# Patient Record
Sex: MALE | Race: WHITE | Employment: OTHER | URBAN - METROPOLITAN AREA
[De-identification: names, ages, dates, MRNs, and addresses within clinical notes are randomized per-mention and may not be internally consistent; named-entity substitution may affect disease eponyms.]

---

## 2018-12-23 ENCOUNTER — OFFICE VISIT (OUTPATIENT)
Dept: URGENT CARE | Facility: URGENT CARE | Age: 78
End: 2018-12-23
Payer: COMMERCIAL

## 2018-12-23 VITALS
SYSTOLIC BLOOD PRESSURE: 175 MMHG | DIASTOLIC BLOOD PRESSURE: 85 MMHG | WEIGHT: 191 LBS | HEART RATE: 86 BPM | TEMPERATURE: 97.9 F | OXYGEN SATURATION: 96 %

## 2018-12-23 DIAGNOSIS — B96.89 BACTERIAL CONJUNCTIVITIS OF BOTH EYES: Primary | ICD-10-CM

## 2018-12-23 DIAGNOSIS — H10.9 BACTERIAL CONJUNCTIVITIS OF BOTH EYES: Primary | ICD-10-CM

## 2018-12-23 PROCEDURE — 99203 OFFICE O/P NEW LOW 30 MIN: CPT | Performed by: NURSE PRACTITIONER

## 2018-12-23 RX ORDER — POLYMYXIN B SULFATE AND TRIMETHOPRIM 1; 10000 MG/ML; [USP'U]/ML
1-2 SOLUTION OPHTHALMIC EVERY 4 HOURS
Qty: 5 ML | Refills: 0 | Status: SHIPPED | OUTPATIENT
Start: 2018-12-23 | End: 2019-04-19

## 2018-12-23 NOTE — PROGRESS NOTES
SUBJECTIVE:  Chief Complaint:   Chief Complaint   Patient presents with     Eye Problem     Right, red, itchy, swollen x 3 days. Starting to go in to red eye.     History of Present Illness:  Alonzo Jack is a 78 year old male who presents complaining of mild right eye discharge, mattering, redness, eyelid swelling for 1 day(s).   Onset/timing: sudden.    Now spreading to left eye  Associated Signs and Symptoms: nasal drainage  Treatment measures tried include: none  Contact wearer : No   Denies foreign body, eye pain, visual changes, fever    No past medical history on file.  Current Outpatient Medications   Medication Sig Dispense Refill     acetaminophen (TYLENOL) 325 MG tablet Take 1-2 tablets by mouth every 6 hours as needed.       ATENOLOL OR one tablet daily       azithromycin (ZITHROMAX) 250 MG tablet Two tablets first day, then one tablet daily for four days 6 tablet 0     FENOFIBRATE 145 MG OR TABS 1 TABLET DAILY       GLUCOVANCE 2.5-500 MG OR TABS 1 TABLET TWICE DAILY WITH FOOD       guaiFENesin/codeine (ROBITUSSIN AC) 100-10 MG/5ML SOLN Take 5 mLs by mouth At Bedtime. 120 mL 0     SIMVASTATIN 20 MG OR TABS 1 TABLET EVERY EVENING       ZESTORETIC 20-25 MG OR TABS 1 TABLET DAILY        Family history reviewed  Mom,   Father,     ROS:  CONSTITUTIONAL:NEGATIVE for fever, chills, change in weight  INTEGUMENTARY/SKIN: NEGATIVE for worrisome rashes, moles or lesions  EYES: POSITIVE for mattering right and redness bilaterally, right eyelid mild swelling  ENT/MOUTH: NEGATIVE for ear, mouth and throat problems  RESP:NEGATIVE for significant cough or SOB    OBJECTIVE:  /85   Pulse 86   Temp 97.9  F (36.6  C) (Oral)   Wt 86.6 kg (191 lb)   SpO2 96%   General: no acute distress  Eye exam: right eye abnormal findings: conjunctivitis with erythema, discharge and matting noted, mild eyelid swelling. No periorbital cellulitis.   left eye abnormal findings: conjunctivitis with erythema,  discharge and matting noted.  Ears: normal canals, TMs bilaterally, normal TM mobility  Nose: NORMAL - no drainage, turbinates normal in size.  Neck: supple, non-tender, free range of motion, no adenopathy  Heart: NORMAL - regular rate and rhythm without murmur.  Lungs: normal and clear to auscultation    ASSESSMENT:  Bacterial Conjunctivitis    PLAN:  Warm packs for comfort. Hygiene measures discussed. Polytrim ophthalmic drops-1-2 drops in the affected eye(s) every 4 hours while awake for 3 days.  Home treat and monitor symptoms  Call or rtc if worsening or not improving    THEA Soliman CNP

## 2019-04-19 ENCOUNTER — OFFICE VISIT (OUTPATIENT)
Dept: URGENT CARE | Facility: URGENT CARE | Age: 79
End: 2019-04-19
Payer: COMMERCIAL

## 2019-04-19 VITALS
OXYGEN SATURATION: 94 % | TEMPERATURE: 98.1 F | HEART RATE: 82 BPM | SYSTOLIC BLOOD PRESSURE: 130 MMHG | DIASTOLIC BLOOD PRESSURE: 52 MMHG

## 2019-04-19 DIAGNOSIS — R04.0 EPISTAXIS: Primary | ICD-10-CM

## 2019-04-19 PROCEDURE — 99213 OFFICE O/P EST LOW 20 MIN: CPT | Performed by: FAMILY MEDICINE

## 2019-04-19 RX ORDER — CLOPIDOGREL BISULFATE 75 MG/1
75 TABLET ORAL
COMMUNITY
Start: 2018-12-28 | End: 2019-06-04

## 2019-04-19 NOTE — PROGRESS NOTES
Chief complaint: right nostril epsitaxis    Patient on Plavix.  Has a history of recurrent epistaxis   Also COPD on oxygen     Patient states that he has been bleeding for 2 hours at home    Accompanied by wife    Patient has no other history of bleeding diathesis    Was in the ER 2 weeks ago and had this cauterized and had nasal packing.  He was fine afterwards until today    He states he had put pressure on it but not much relief    No Known Allergies    No past medical history on file.   Reviewed Epic see above.       Current Outpatient Medications on File Prior to Visit:  acetaminophen (TYLENOL) 325 MG tablet Take 1-2 tablets by mouth every 6 hours as needed.   ATENOLOL OR one tablet daily   clopidogrel (PLAVIX) 75 MG tablet Take 75 mg by mouth   FENOFIBRATE 145 MG OR TABS 1 TABLET DAILY   GLUCOVANCE 2.5-500 MG OR TABS 1 TABLET TWICE DAILY WITH FOOD   SIMVASTATIN 20 MG OR TABS 1 TABLET EVERY EVENING   ZESTORETIC 20-25 MG OR TABS 1 TABLET DAILY     No current facility-administered medications on file prior to visit.     Social History     Tobacco Use     Smoking status: Former Smoker   Substance Use Topics     Alcohol use: No     Drug use: No       ROS:  review of systems negative except for noted above.   No thoughts of harming self or others.     OBJECTIVE:  /52   Pulse 82   Temp 98.1  F (36.7  C) (Tympanic)   SpO2 94%    General:   awake, alert, and cooperative.  NAD.   Head: Normocephalic, atraumatic.  Eyes: Conjunctiva clear,   ENT: no congestion. Bilateral TYMPANINC MEMBRANE normal   Deviated septum to the right  Initially had some oozing of blood  No obvious bleeding source to cauterize  Mouth: moist buccal mucosa nonhyperemic posterior pharyngeal wall tonsils not enlarged  Neck: supple no cervical lymphadenopathy  Heart: Regular rate and rhythm. No murmur.  Lungs: Chest is clear; no wheezes or rales.   Abdomen: soft non-tender.  Neuro: Alert and oriented - normal speech.  MS: Using extremities  freely  PSYCH:  Normal affect, normal speech  SKIN: no obvious rashes    ASSESSMENT:    ICD-10-CM    1. Epistaxis R04.0        PLAN:   Urgent care course:  Pressure placed and area was moistened with vaseline  After pressure for 20 minutes bleeding stopped  Patient was observed for another 20 minutes and no more bleeding noted  First aid reviewed  Alarm signs or symptoms discussed, if present recommend go to ER   If recurs or heavy or concerns overnight ehy will go to ER.       Advised about symptoms which might herald more serious problems.        Abeba Oates MD

## 2019-05-07 ENCOUNTER — CARE COORDINATION (OUTPATIENT)
Dept: CARDIOLOGY | Facility: CLINIC | Age: 79
End: 2019-05-07

## 2019-05-13 NOTE — PROGRESS NOTES
Referral- Heart Failure      SP[facility] Notes:     Received referral via TE and Fax. Sending to Dulce.       DEMOGRAPHICS       Demographic Information on Alonzo Jack:    Alonzo Jack  Gender: male  : 1940  [address] [facility]  [place] [address] [phone] (home)     Medical Record: [id number]  Social Security Number: [id number]  Pharmacy:    [facility] - [address] [facility] [address] [place] [address] [id number] [place], [place] - [address] [facility]  Primary Care Provider: Alina Lopez    Parent's names are: Data Unavailable (mother) and Data Unavailable (father).    Insurance: Payor: [employer] / Plan: [employer] MEDICARE ADVANTAGE / Product Type: Medicare /       REFERRAL INFORMATION         Referring Provider Yohan Capone PA-C    Referring Clinic [facility]    Referring Contact Info [phone]   Referring Diagnosis HF   Referring Urgency Routine

## 2019-05-14 NOTE — PROGRESS NOTES
Spoke to patient's granddaughter, Moriah Farley who is an RN.  Patient has recently been hospitalized for acute on chronic heart failure. His EF on echo is 45% and he has a chronic anemia which requires transfusions.  Moriah reports they were told by providers that they are not aware of what is causing the anemia.  Have talked with Moriah on 2 separate occasions as she has initiated the referral for another opinion on patient's heart failure.

## 2019-05-30 DIAGNOSIS — I50.20 HFREF (HEART FAILURE WITH REDUCED EJECTION FRACTION) (H): Primary | ICD-10-CM

## 2019-06-04 ENCOUNTER — CARE COORDINATION (OUTPATIENT)
Dept: CARDIOLOGY | Facility: CLINIC | Age: 79
End: 2019-06-04

## 2019-06-04 PROBLEM — Z87.19 H/O: GI BLEED: Status: ACTIVE | Noted: 2019-06-04

## 2019-06-04 PROBLEM — R07.9 CHEST PAIN: Status: ACTIVE | Noted: 2018-04-02

## 2019-06-04 PROBLEM — R06.09 DYSPNEA ON EXERTION: Status: ACTIVE | Noted: 2019-04-22

## 2019-06-04 PROBLEM — I50.33 ACUTE ON CHRONIC DIASTOLIC (CONGESTIVE) HEART FAILURE (H): Status: ACTIVE | Noted: 2018-02-15

## 2019-06-04 PROBLEM — I21.4 NSTEMI (NON-ST ELEVATED MYOCARDIAL INFARCTION) (H): Status: ACTIVE | Noted: 2019-04-22

## 2019-06-04 PROBLEM — N18.30 CKD (CHRONIC KIDNEY DISEASE) STAGE 3, GFR 30-59 ML/MIN (H): Status: ACTIVE | Noted: 2019-04-05

## 2019-06-04 PROBLEM — J43.9 PULMONARY EMPHYSEMA (H): Status: ACTIVE | Noted: 2019-04-05

## 2019-06-04 RX ORDER — NITROGLYCERIN 0.4 MG/1
0.4 TABLET SUBLINGUAL
COMMUNITY
Start: 2018-04-06

## 2019-06-04 RX ORDER — PANTOPRAZOLE SODIUM 40 MG/1
1 TABLET, DELAYED RELEASE ORAL
COMMUNITY

## 2019-06-04 RX ORDER — METOPROLOL SUCCINATE 100 MG/1
1 TABLET, EXTENDED RELEASE ORAL DAILY
Refills: 10 | COMMUNITY
Start: 2018-12-08

## 2019-06-04 RX ORDER — LOSARTAN POTASSIUM 100 MG/1
1 TABLET ORAL DAILY
Refills: 1 | COMMUNITY
Start: 2018-10-19

## 2019-06-04 RX ORDER — ATORVASTATIN CALCIUM 80 MG/1
1 TABLET, FILM COATED ORAL DAILY
COMMUNITY
Start: 2019-04-07

## 2019-06-04 RX ORDER — TIOTROPIUM BROMIDE 18 UG/1
18 CAPSULE ORAL; RESPIRATORY (INHALATION)
COMMUNITY
Start: 2019-03-29

## 2019-06-04 RX ORDER — DULOXETIN HYDROCHLORIDE 30 MG/1
1 CAPSULE, DELAYED RELEASE ORAL DAILY
COMMUNITY
Start: 2019-04-17

## 2019-06-04 RX ORDER — AMLODIPINE BESYLATE 10 MG/1
1 TABLET ORAL DAILY
COMMUNITY
Start: 2016-07-25

## 2019-06-04 RX ORDER — ALBUTEROL SULFATE 90 UG/1
2 AEROSOL, METERED RESPIRATORY (INHALATION) EVERY 6 HOURS PRN
COMMUNITY
Start: 2019-03-29

## 2019-06-04 RX ORDER — POTASSIUM CHLORIDE 1500 MG/1
1 TABLET, EXTENDED RELEASE ORAL DAILY
COMMUNITY

## 2019-06-04 RX ORDER — MAGNESIUM OXIDE 400 MG/1
400 TABLET ORAL DAILY
COMMUNITY
Start: 2019-04-07

## 2019-06-04 RX ORDER — OXYMETAZOLINE HYDROCHLORIDE 0.05 G/100ML
2 SPRAY NASAL PRN
COMMUNITY
Start: 2019-04-27

## 2019-06-04 RX ORDER — OXYCODONE HYDROCHLORIDE 5 MG/1
1 TABLET ORAL EVERY 6 HOURS PRN
COMMUNITY
Start: 2019-04-18

## 2019-06-04 RX ORDER — BUMETANIDE 2 MG/1
1 TABLET ORAL 2 TIMES DAILY
COMMUNITY
Start: 2019-04-27

## 2019-06-04 RX ORDER — ISOSORBIDE MONONITRATE 30 MG/1
1 TABLET, EXTENDED RELEASE ORAL DAILY
COMMUNITY
Start: 2019-04-08

## 2019-06-04 RX ORDER — CARVEDILOL 25 MG/1
1 TABLET ORAL 2 TIMES DAILY WITH MEALS
COMMUNITY
Start: 2018-12-27

## 2019-06-04 RX ORDER — TIZANIDINE HYDROCHLORIDE 2 MG/1
2 CAPSULE, GELATIN COATED ORAL DAILY
COMMUNITY
Start: 2019-04-17

## 2019-06-04 NOTE — PROGRESS NOTES
Patient has been followed at [facility] and has h/o several ED visits/hospitalizations since December, 2018, most recently in April, 2019. Following his last hospitalization granddaughter reports patient was told by providers that they were unsure why he continued to have heart failure and recommended hospice. Patient is referred at the request of his granddaughter, Moriah, who is a nurse and seeking a second opinion regarding patient's heart failure.     Echo 12/2018 shows EF 50-55% and recently 4/2019, EF 45%. Unknown whether patient has had coronary angiogram in past, but had CT Calcium scan in 4/2018 as it was felt he would not be able to take plavix (due to recurrent epistaxis) if PCI needed to be done. Nuclear stress was done 9/2018 which appeared normal.     Patient has had h/o colon cancer (neg lymph nodes)requiring right colectomy in 7/2016. Appears to have had bleeding issues/anemia since that time; GI bleed in 2/2018, endoscopy showed multiple non bleeding ulcers, bone marrow biopsy 2/2018 neg for leukemia. 2/2019 - epistaxis while on plavix. Does not appear Last colonoscopy was 4/2018 when a 6 mm polyp was removed. Granddaughter reports patient is followed by hematology/oncology, but she is unaware if patient has had any further hematological evaluation for the anemia.     History below has been copied from notes in Care Everywhere.     1. Non-obstructive CAD-CAC 3334 on 4/2018  2. Hypertension  3. Chronic HFpEF    Echo 12/26/2018 - EF 50-55%    E/e' ratio >15 - suggesting elevated LV filling pressure.  4. Hyperlipidemia  5. Diabetes mellitus, type 2  6. COPD  7. CKD, stage 3, baseline Cr 1.5   8. Atrial fibrillation    Cardiac  03/04/19-03/06/19 adm for SOB and CHF exacerbation. , Troponin 0.14 and flat. Diuresed with IV lasix and discharged on Lasix 40 mg daily.   03/11/19 clinic visit: BNP 1,096 Lasix increased to 40 mg BID.   03/27/19 clinic visit: appeared euvolemic, Lasix decreased to 40  mg daily.   04/05/19/-04/07/19 admission for chest pain and CHF exacerbation. . Troponin negative. Diuresed with IV Lasix and discharged on Lasix 40 mg BID  04/17/19 clinic visit: appeared hypervolemic, Lasix increased to 80 mg BID    04/27/19   BRIEF HOSPITAL COURSE: This 78 y.o. male was admitted after experiencing worsening shortness of breath and chest pain. He has had numerous recent hospitalizations and ER visits. He has had he has had a recent dosage increase of his furosemide to 80 mg p.o. twice daily at the cardiology clinic on 4/17/2019. Despite that, he again presented with acute exacerbation of heart failure. Cardiology was consulted. He was diuresed with IV Bumex and then transitioned to oral Bumex. He had improvement in his symptoms. He had mild elevation of his troponin but the trend was flat. He also had a myocardial perfusion imaging test on 9/10/2018 which was probably normal per report. His Plavix had to be discontinued due to significant recurrent epistaxis. This is also one reason that cardiology wanted to hold off on coronary angiography since the patient will not be able to tolerate Plavix.

## 2019-06-05 ENCOUNTER — TELEPHONE (OUTPATIENT)
Dept: CARDIOLOGY | Facility: CLINIC | Age: 79
End: 2019-06-05

## 2019-06-05 ENCOUNTER — OFFICE VISIT (OUTPATIENT)
Dept: CARDIOLOGY | Facility: CLINIC | Age: 79
End: 2019-06-05
Attending: INTERNAL MEDICINE
Payer: MEDICARE

## 2019-06-05 VITALS
WEIGHT: 179.2 LBS | DIASTOLIC BLOOD PRESSURE: 81 MMHG | SYSTOLIC BLOOD PRESSURE: 167 MMHG | BODY MASS INDEX: 28.8 KG/M2 | OXYGEN SATURATION: 93 % | HEART RATE: 91 BPM | HEIGHT: 66 IN

## 2019-06-05 DIAGNOSIS — R07.89 ATYPICAL CHEST PAIN: ICD-10-CM

## 2019-06-05 DIAGNOSIS — I50.22 CHRONIC SYSTOLIC HEART FAILURE (H): Primary | ICD-10-CM

## 2019-06-05 DIAGNOSIS — I50.20 HFREF (HEART FAILURE WITH REDUCED EJECTION FRACTION) (H): ICD-10-CM

## 2019-06-05 LAB
ALBUMIN SERPL-MCNC: 3 G/DL (ref 3.4–5)
ALP SERPL-CCNC: 149 U/L (ref 40–150)
ALT SERPL W P-5'-P-CCNC: 43 U/L (ref 0–70)
ANION GAP SERPL CALCULATED.3IONS-SCNC: 6 MMOL/L (ref 3–14)
AST SERPL W P-5'-P-CCNC: 23 U/L (ref 0–45)
BILIRUB SERPL-MCNC: 0.8 MG/DL (ref 0.2–1.3)
BUN SERPL-MCNC: 28 MG/DL (ref 7–30)
CALCIUM SERPL-MCNC: 8.3 MG/DL (ref 8.5–10.1)
CHLORIDE SERPL-SCNC: 98 MMOL/L (ref 94–109)
CO2 SERPL-SCNC: 30 MMOL/L (ref 20–32)
CREAT SERPL-MCNC: 1.11 MG/DL (ref 0.66–1.25)
ERYTHROCYTE [DISTWIDTH] IN BLOOD BY AUTOMATED COUNT: 16.4 % (ref 10–15)
GFR SERPL CREATININE-BSD FRML MDRD: 64 ML/MIN/{1.73_M2}
GLUCOSE SERPL-MCNC: 127 MG/DL (ref 70–99)
HCT VFR BLD AUTO: 32.8 % (ref 40–53)
HGB BLD-MCNC: 10.3 G/DL (ref 13.3–17.7)
MAGNESIUM SERPL-MCNC: 1.6 MG/DL (ref 1.6–2.3)
MCH RBC QN AUTO: 26.2 PG (ref 26.5–33)
MCHC RBC AUTO-ENTMCNC: 31.4 G/DL (ref 31.5–36.5)
MCV RBC AUTO: 84 FL (ref 78–100)
NT-PROBNP SERPL-MCNC: 6693 PG/ML (ref 0–450)
PLATELET # BLD AUTO: 146 10E9/L (ref 150–450)
POTASSIUM SERPL-SCNC: 4.2 MMOL/L (ref 3.4–5.3)
PROT SERPL-MCNC: 6.9 G/DL (ref 6.8–8.8)
RBC # BLD AUTO: 3.93 10E12/L (ref 4.4–5.9)
SODIUM SERPL-SCNC: 133 MMOL/L (ref 133–144)
WBC # BLD AUTO: 3.9 10E9/L (ref 4–11)

## 2019-06-05 PROCEDURE — 83735 ASSAY OF MAGNESIUM: CPT | Performed by: INTERNAL MEDICINE

## 2019-06-05 PROCEDURE — 85027 COMPLETE CBC AUTOMATED: CPT | Performed by: INTERNAL MEDICINE

## 2019-06-05 PROCEDURE — 93010 ELECTROCARDIOGRAM REPORT: CPT | Mod: ZP | Performed by: INTERNAL MEDICINE

## 2019-06-05 PROCEDURE — 93005 ELECTROCARDIOGRAM TRACING: CPT | Mod: ZF

## 2019-06-05 PROCEDURE — 99205 OFFICE O/P NEW HI 60 MIN: CPT | Mod: ZP | Performed by: INTERNAL MEDICINE

## 2019-06-05 PROCEDURE — 36415 COLL VENOUS BLD VENIPUNCTURE: CPT | Performed by: INTERNAL MEDICINE

## 2019-06-05 PROCEDURE — G0463 HOSPITAL OUTPT CLINIC VISIT: HCPCS

## 2019-06-05 PROCEDURE — 80053 COMPREHEN METABOLIC PANEL: CPT | Performed by: INTERNAL MEDICINE

## 2019-06-05 PROCEDURE — 83880 ASSAY OF NATRIURETIC PEPTIDE: CPT | Performed by: INTERNAL MEDICINE

## 2019-06-05 RX ORDER — SPIRONOLACTONE 25 MG/1
25 TABLET ORAL DAILY
COMMUNITY

## 2019-06-05 RX ORDER — GUAIFENESIN 200 MG/1
200 TABLET ORAL PRN
COMMUNITY

## 2019-06-05 RX ORDER — GLIPIZIDE 10 MG/1
TABLET ORAL
Refills: 11 | COMMUNITY
Start: 2019-03-31

## 2019-06-05 RX ORDER — FUROSEMIDE 40 MG
TABLET ORAL
Refills: 0 | COMMUNITY
Start: 2019-04-07

## 2019-06-05 ASSESSMENT — ENCOUNTER SYMPTOMS
BOWEL INCONTINENCE: 0
ORTHOPNEA: 0
EXERCISE INTOLERANCE: 1
SWOLLEN GLANDS: 0
SINUS CONGESTION: 1
DECREASED APPETITE: 0
BLOATING: 0
MUSCLE WEAKNESS: 0
CHILLS: 1
FEVER: 0
ALTERED TEMPERATURE REGULATION: 1
RECTAL PAIN: 0
BRUISES/BLEEDS EASILY: 0
TROUBLE SWALLOWING: 0
FATIGUE: 1
COUGH: 1
LEG PAIN: 0
NECK MASS: 0
HYPERTENSION: 1
COUGH DISTURBING SLEEP: 0
SLEEP DISTURBANCES DUE TO BREATHING: 0
POSTURAL DYSPNEA: 0
WEIGHT GAIN: 1
WHEEZING: 0
ARTHRALGIAS: 0
POLYPHAGIA: 0
NIGHT SWEATS: 0
MUSCLE CRAMPS: 0
SMELL DISTURBANCE: 0
PALPITATIONS: 0
MYALGIAS: 0
CONSTIPATION: 1
DIARRHEA: 1
BACK PAIN: 1
STIFFNESS: 1
HEMOPTYSIS: 0
HOARSE VOICE: 0
POLYDIPSIA: 0
SORE THROAT: 0
HYPOTENSION: 0
SPUTUM PRODUCTION: 1
NAUSEA: 0
SNORES LOUDLY: 0
SHORTNESS OF BREATH: 1
TASTE DISTURBANCE: 0
VOMITING: 0
LIGHT-HEADEDNESS: 0
NECK PAIN: 0
WEIGHT LOSS: 0
JAUNDICE: 0
JOINT SWELLING: 0
HEARTBURN: 0
SYNCOPE: 0
SINUS PAIN: 0
ABDOMINAL PAIN: 0
DYSPNEA ON EXERTION: 1
BLOOD IN STOOL: 0

## 2019-06-05 ASSESSMENT — PAIN SCALES - GENERAL: PAINLEVEL: NO PAIN (0)

## 2019-06-05 ASSESSMENT — MIFFLIN-ST. JEOR: SCORE: 1485.6

## 2019-06-05 NOTE — TELEPHONE ENCOUNTER
Health Call Center    Phone Message    May a detailed message be left on voicemail: yes    Reason for Call: Other: [facility] calling to advise the pt is on hospice and will be coming in today. She says that because he is on hospice they are not authorizing alot of test. If you have any quesitons, please all [facility] back.      Action Taken: Message routed to:  [facility] ([facility]

## 2019-06-05 NOTE — NURSING NOTE
Chief Complaint   Patient presents with     New Patient     New Advanced Heart Failure, 77 yo male, HFrEF, EF 45% and he has a chronic anemia which requires transfusions. Labs prior.     Vitals were taken and medications were reconciled.    Angle Pacheco MA    3:25 PM

## 2019-06-05 NOTE — LETTER
2019      RE: Alonzo Jack  [address] [facility] [id number]       Dear Colleague,    Thank you for the opportunity to participate in the care of your patient, Alonzo Jack, at the [facility]. Please see a copy of my visit note below.    ADVANCED HEART FAILURE CLINIC CONSULTATION     Name: Alonzo Jack  : 1940  MRN: [id number]    2019    Dear Mohit Jade and Estelita,    I had the pleasure of seeing Alonzo Jack, a 78 year old man today 2019 in the [facility] Failure [facility] for evaluation of heart failure. He is here with his wife and granddaughter, Moriah.     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 In the last 6 months he has had increased dyspnea and atypical chest pain resulting in 5 emergency department visits and 2 recent hospital admissions. He has seen Dr. Quigley at [facility] for atypical chest pain and had a perfusion stress test that did not show any ischemia, but had a calcium score of 589. He was on clopidogrel for aspirin intolerance, but had significant epistaxis. Coronary angiography has not been pursued due to the DAPT intolerance and CKD. He is also continuing to require transfusions despite stopping plavix. He has had extensive GI evaluation including EGD and colonscopy which were negative, and capsule endoscopy, which I do not have access to the results.  In 2018 his LVEF was 55-60% and on his most recent echo 19 his LVEF is 45% with LVEDD 5.88cm, basal-mid inferior hypokinesis, stage III diastolic dysfunction, mildly reduced RV function, RVSP 37 + RA, and moderate MR. He was  hospitalized in April and the decision was made to transition to hospice care due to chest pain and heart failure. The family feels the hospice transition was abrupt as he was very functional 6 months ago. He was going fishing and going to the [place] with his wife at that time. He is having chest pain that is sharp that lasts 15 min to 1 hr and occurs only with rest and not activity. He thinks it is more frequent now that in was 6 months ago and the chest pain is also associated with fatigue and dyspnea. He did not feel any different while on plavix. He does feel the chest pain is worse when he is very anemic. His CAD risk factors include hypertension, dyslipidemia, diabetes, and tobacco use. He feel very anxious when he has chest pain. He is limiting activity due to chest pain and dyspnea. He took nitroglycerin tablet last weekend for chest pain but did not have any relief. He is in a wheel chair today, but he is howard to ambulate at home. He states that he is worried if he gets chest pain when outside of the home and would rather be in the wheel chair. He has dyspnea when going up the stairs with the oxygen tank. He is getting up to the bathroom and cooking without dyspnea or chest pain.  His weight at home is 179 lbs and he has no edema, orthopnea, or PND. He eats a low sodium diet, but drinks a minimum of a gallon of water. He denies lightheadedness or palpitations. His appetite is good.     REVIEW OF SYSTEMS:  10 point ROS neg other than the symptoms noted above in the HPI.    PAST MEDICAL HISTORY:   1. Chronic systolic and diastolic heart failure  A. Echo 4/22/19    LVEF 45%, small-mod inferolateral hypokinesis, LVEDD 5.88, normal wall thickness, mildly reduced RV function, RVSP 37   Moderate FLORENTIN  B. Echo 12/26/18: LVEF 50-55%    2. Non obstructive CAD with Total calcium score 598 4/2018  3. COPD on home oxygen, moderate obstruction, mild restriction  4. Hypertension  5. Dyslipidemia  6. Atrial fibrillation, not  anticoagulated due to recurrent anemia  7. History of colon cancer diagnosed in 2017 s/p surgical resection, but no chemo or radiation were required   8. Recurrent epistaxis on plavix  9. Anemia, requiring blood transfusions     ALLERGIES:  Cyclobenzaprine     MEDICATIONS:     Current Outpatient Medications on File Prior to Visit:  acetaminophen (TYLENOL) 325 MG tablet Take 1-2 tablets by mouth every 6 hours as needed.   albuterol (PROVENTIL HFA) 108 (90 Base) MCG/ACT inhaler Inhale 2 puffs into the lungs every 6 hours as needed   amLODIPine (NORVASC) 10 MG tablet Take 1 tablet by mouth daily   carvedilol (COREG) 25 MG tablet Take 1 tablet by mouth 2 times daily (with meals)   DULoxetine (CYMBALTA) 30 MG capsule Take 1 capsule by mouth daily   furosemide (LASIX) 40 MG tablet    glipiZIDE (GLUCOTROL) 10 MG tablet    guaiFENesin (ORGANIDIN) 200 MG tablet Take 200 mg by mouth as needed for cough   insulin glargine (LANTUS SOLOSTAR PEN) 100 UNIT/ML pen Inject 20 Units Subcutaneous At Bedtime   isosorbide mononitrate (IMDUR) 30 MG 24 hr tablet Take 1 tablet by mouth daily   melatonin 5 MG tablet Take 5 mg by mouth At Bedtime   nitroGLYcerin (NITROSTAT) 0.4 MG sublingual tablet Place 0.4 mg under the tongue Every 5 in up to 3 tabs   pantoprazole (PROTONIX) 40 MG EC tablet Take 1 tablet by mouth 2 times daily (before meals)   potassium chloride ER (K-DUR/KLOR-CON M) 20 MEQ CR tablet Take 1 tablet by mouth daily With a meal   spironolactone (ALDACTONE) 25 MG tablet Take 25 mg by mouth daily   tiotropium (SPIRIVA HANDIHALER) 18 MCG inhaled capsule Inhale 18 mcg into the lungs daily   tiZANidine (ZANAFLEX) 2 MG capsule Take 2 mg by mouth daily For muscle spasms   UNABLE TO FIND MEDICATION NAME: Morphine 7.5 PRN   atorvastatin (LIPITOR) 80 MG tablet Take 1 tablet by mouth daily HS   bumetanide (BUMEX) 2 MG tablet Take 1 tablet by mouth 2 times daily   losartan (COZAAR) 100 MG tablet Take 1 tablet by mouth daily   magnesium  "oxide (MAG-OX) 400 MG tablet Take 400 mg by mouth daily   metoprolol succinate ER (TOPROL-XL) 100 MG 24 hr tablet Take 1 tablet by mouth daily   oxyCODONE (ROXICODONE) 5 MG tablet Take 1 tablet by mouth every 6 hours as needed for pain   oxymetazoline (NASAL RELIEF) 0.05 % nasal spray Spray 2 sprays in nostril as needed for congestion Or epistaxis     No current facility-administered medications on file prior to visit.     SOCIAL HISTORY: He is  and lives with his wife in [place]. Moriah his granddaughter helps them frequently. No alcohol or illicits. Smoked 40 years ago - 2 ppd 15 years     FAMILY HISTORY: No pertinent family history in this 78 year-old man    PHYSICAL EXAM:   /81 (BP Location: Left arm, Patient Position: Chair, Cuff Size: Adult Regular)   Pulse 91   Ht 1.676 m (5' 6\")   Wt 81.3 kg (179 lb 3.2 oz)   SpO2 93%   BMI 28.92 kg/m     General: sitting in a wheelchair, NAD, conversant  Neck: Estimate JVP <8cm at 90 degrees, normal carotid upstroke  CV: RRR, nl s1 and s2, no murmurs, rubs, or gallops  Lungs: CTAB, no crackles or wheezes  Abdomen: BS+, soft, non tender, non distended   Extremities: warm and well perfused, trace edema   Neuro: normal speech and gait  Psych: anxious  Skin: ecchymosis at prior IV sites     DIAGNOSTIC TESTING:    Ref. Range 6/5/2019 14:50   Sodium Latest Ref Range: 133 - 144 mmol/L 133   Potassium Latest Ref Range: 3.4 - 5.3 mmol/L 4.2   Chloride Latest Ref Range: 94 - 109 mmol/L 98   Carbon Dioxide Latest Ref Range: 20 - 32 mmol/L 30   Urea Nitrogen Latest Ref Range: 7 - 30 mg/dL 28   Creatinine Latest Ref Range: 0.66 - 1.25 mg/dL 1.11   GFR Estimate Latest Ref Range: >60 mL/min/1.73_m2 64   GFR Estimate If Black Latest Ref Range: >60 mL/min/1.73_m2 74   Calcium Latest Ref Range: 8.5 - 10.1 mg/dL 8.3 (L)   Anion Gap Latest Ref Range: 3 - 14 mmol/L 6   Magnesium Latest Ref Range: 1.6 - 2.3 mg/dL 1.6   Albumin Latest Ref Range: 3.4 - 5.0 g/dL 3.0 (L)   Protein " Total Latest Ref Range: 6.8 - 8.8 g/dL 6.9   Bilirubin Total Latest Ref Range: 0.2 - 1.3 mg/dL 0.8   Alkaline Phosphatase Latest Ref Range: 40 - 150 U/L 149   ALT Latest Ref Range: 0 - 70 U/L 43   AST Latest Ref Range: 0 - 45 U/L 23   N-Terminal Pro Bnp Latest Ref Range: 0 - 450 pg/mL 6,693 (H)   Glucose Latest Ref Range: 70 - 99 mg/dL 127 (H)   WBC Latest Ref Range: 4.0 - 11.0 10e9/L 3.9 (L)   Hemoglobin Latest Ref Range: 13.3 - 17.7 g/dL 10.3 (L)   Hematocrit Latest Ref Range: 40.0 - 53.0 % 32.8 (L)   Platelet Count Latest Ref Range: 150 - 450 10e9/L 146 (L)   RBC Count Latest Ref Range: 4.4 - 5.9 10e12/L 3.93 (L)   MCV Latest Ref Range: 78 - 100 fl 84   MCH Latest Ref Range: 26.5 - 33.0 pg 26.2 (L)   MCHC Latest Ref Range: 31.5 - 36.5 g/dL 31.4 (L)   RDW Latest Ref Range: 10.0 - 15.0 % 16.4 (H)     ECG personally reviewed: sinus with PVCs, bifasicular block    Echocardiogram:12/26/2018  Results:  The ejection fraction is visually estimated at 50-55%.  Hypokinetic inferior wall.  Moderate mitral regurgitation.  Estimated pulmonary artery pressure of 55 mmHg + RA pressure.  Dilated IVC size, >50% collapse with respiration.  Estimated EF:    50-55%    Echocardiogram: 4/22/19  Normal left ventricular size; visually estimated ejection fraction is mild to moderately reduced at 45%.  Regional wall motion abnormalities noted: small to moderate sized area of basal to mid inferior/inferolateral hypokinesis to akinesis.  Mild right ventricular dilatation; mildly reduced right ventricular global systolic function.  Estimated right & left ventricular filling pressures are elevated.  Moderate biatrial dilatation.  Moderate functional (ischemic) mitral regurgitation.  Left pleural effusion.    When compared to the previous echocardiographic images of 12/26/2018, there has been no significant change.    ischemic work-up: 09/10/18 NM stress  IMPRESSION  1. Myocardial perfusion imaging is probably normal. Slight diaphragm  attenuation artifact is noted but attenuation corrected imaging appears normal.  2. The pharmacological stress ECG is nondiagnostic due to baseline changes.  3. No prior studies to compare.  4. Abnormal calcium score is noted. Total score 598. Left main 27, , circumflex 20, RCA 0. This would put the patient at the 66th percentile for age, match controls.      ASSESSMENT AND PLAN: 78 year-old man with   1. Chronic systolic heart failure, secondary to undetermined etiology, Class II-III, stage C  2. Hypertension  3. Atypical chest pain  4. CAD based on imaging  5. Anemia, of undetermined etiology requiring transfusion   6. COPD on oxygen  7. CKD - creatine normalized     Mr. Jack has a number of complex medical problems as above and has had a functional decline in the past 6 months secondary to systolic heart failure, atypical chest pain, anemia and co-morbid conditions and is currently on hospice care.  From a heart failure standpoint he is well compensated, euvolemic by exam, and has normal end organ function. The etiology of his mild decline in LVEF has not been determined. It is unlikely related to infiltrative cardiomyopathy, though he has anemia and CKD. He has CAD by imaging and a number of CAD risk factors and this combined with hypertension are the most likely culprits. His symptoms including dyspnea and chest pain are somewhat out of proportion and exacerbated by anemia. He does feel considerably better now that his hemoglobin is more stable. His chest pain is atypical and has worsened in the last 6 months. I think it would be reasonable to obtain an ischemic evaluation given the clinical change to better stratify his symptoms. I would like to obtain a cardiac MRI with stress and contrast. If he does have obstructive CAD, I would need to discuss possible antiplatelet strategies with Dr. Jade. He is already on nitrates, but other medical management could also be considered such as ranolazine. He  will also benefit from increased afterload reduction to reduce demand. Additionally, cardiac rehab may also be of benefit. He and his family will discuss what they would like to do as diagnostic testing would not be possible on hospice.     PLAN:   -CMR with stress and contrast  -discuss anemia with Dr. Jade       Thank you for allowing me to participate in the care of your patient. Please do not hesitate to contact me if you have any questions.     Sincerely,   Forum     Forum MD Alison, [facility]  Advanced Heart Failure/Transplantation/MCS  [facility]  [phone]    ADDENDUM: I spoke with Dr. Kailyn Jade and she agreed that additional testing was reasonable and that if DAPT was needed that his anemia could be managed. He is responding well to iron infusions and will be monitored closely.     Answers for HPI/ROS submitted by the patient on 6/5/2019   General Symptoms: Yes  Skin Symptoms: No  HENT Symptoms: Yes  EYE SYMPTOMS: No  HEART SYMPTOMS: Yes  LUNG SYMPTOMS: Yes  INTESTINAL SYMPTOMS: Yes  URINARY SYMPTOMS: No  REPRODUCTIVE SYMPTOMS: No  SKELETAL SYMPTOMS: Yes  BLOOD SYMPTOMS: Yes  NERVOUS SYSTEM SYMPTOMS: No  MENTAL HEALTH SYMPTOMS: No  Fever: No  Loss of appetite: No  Weight loss: No  Weight gain: Yes  Fatigue: Yes  Night sweats: No  Chills: Yes  Increased stress: Yes  Excessive hunger: No  Excessive thirst: No  Feeling hot or cold when others believe the temperature is normal: Yes  Loss of height: No  Ear pain: No  Ear discharge: No  Hearing loss: No  Tinnitus: No  Nosebleeds: Yes  Congestion: Yes  Sinus pain: No  Trouble swallowing: No   Voice hoarseness: No  Mouth sores: No  Sore throat: No  Tooth pain: No  Gum tenderness: No  Bleeding gums: No  Change in taste: No  Change in sense of smell: No  Dry mouth: No  Hearing aid used: No  Neck lump: No  Cough: Yes  Sputum or phlegm: Yes  Coughing up blood: No  Difficulty breating or shortness of breath: Yes  Snoring: No  Wheezing:  No  Difficulty breathing on exertion: Yes  Nighttime Cough: No  Difficulty breathing when lying flat: No  Chest pain or pressure: Yes  Fast or irregular heartbeat: No  Pain in legs with walking: No  Trouble breathing while lying down: No  Fingers or toes appear blue: No  High blood pressure: Yes  Low blood pressure: No  Fainting: No  Murmurs: No  Pacemaker: No  Varicose veins: No  Edema or swelling: Yes  Wake up at night with shortness of breath: No  Light-headedness: No  Exercise intolerance: Yes  Heart burn or indigestion: No  Nausea: No  Vomiting: No  Abdominal pain: No  Bloating: No  Constipation: Yes  Diarrhea: Yes  Blood in stool: No  Black stools: No  Rectal or Anal pain: No  Fecal incontinence: No  Yellowing of skin or eyes: No  Vomit with blood: No  Change in stools: No  Back pain: Yes  Muscle aches: No  Neck pain: No  Swollen joints: No  Joint pain: No  Bone pain: No  Muscle cramps: No  Muscle weakness: No  Joint stiffness: Yes  Bone fracture: No  Anemia: Yes  Swollen glands: No  Easy bleeding or bruising: No    Answers for HPI/ROS submitted by the patient on 6/5/2019   General Symptoms: Yes  Skin Symptoms: No  HENT Symptoms: Yes  EYE SYMPTOMS: No  HEART SYMPTOMS: Yes  LUNG SYMPTOMS: Yes  INTESTINAL SYMPTOMS: Yes  URINARY SYMPTOMS: No  REPRODUCTIVE SYMPTOMS: No  SKELETAL SYMPTOMS: Yes  BLOOD SYMPTOMS: Yes  NERVOUS SYSTEM SYMPTOMS: No  MENTAL HEALTH SYMPTOMS: No  Fever: No  Loss of appetite: No  Weight loss: No  Weight gain: Yes  Fatigue: Yes  Night sweats: No  Chills: Yes  Increased stress: Yes  Excessive hunger: No  Excessive thirst: No  Feeling hot or cold when others believe the temperature is normal: Yes  Loss of height: No  Ear pain: No  Ear discharge: No  Hearing loss: No  Tinnitus: No  Nosebleeds: Yes  Congestion: Yes  Sinus pain: No  Trouble swallowing: No   Voice hoarseness: No  Mouth sores: No  Sore throat: No  Tooth pain: No  Gum tenderness: No  Bleeding gums: No  Change in taste: No  Change in  sense of smell: No  Dry mouth: No  Hearing aid used: No  Neck lump: No  Cough: Yes  Sputum or phlegm: Yes  Coughing up blood: No  Difficulty breating or shortness of breath: Yes  Snoring: No  Wheezing: No  Difficulty breathing on exertion: Yes  Nighttime Cough: No  Difficulty breathing when lying flat: No  Chest pain or pressure: Yes  Fast or irregular heartbeat: No  Pain in legs with walking: No  Trouble breathing while lying down: No  Fingers or toes appear blue: No  High blood pressure: Yes  Low blood pressure: No  Fainting: No  Murmurs: No  Pacemaker: No  Varicose veins: No  Edema or swelling: Yes  Wake up at night with shortness of breath: No  Light-headedness: No  Exercise intolerance: Yes  Heart burn or indigestion: No  Nausea: No  Vomiting: No  Abdominal pain: No  Bloating: No  Constipation: Yes  Diarrhea: Yes  Blood in stool: No  Black stools: No  Rectal or Anal pain: No  Fecal incontinence: No  Yellowing of skin or eyes: No  Vomit with blood: No  Change in stools: No  Back pain: Yes  Muscle aches: No  Neck pain: No  Swollen joints: No  Joint pain: No  Bone pain: No  Muscle cramps: No  Muscle weakness: No  Joint stiffness: Yes  Bone fracture: No  Anemia: Yes  Swollen glands: No  Easy bleeding or bruising: No      Please do not hesitate to contact me if you have any questions/concerns.     Sincerely,     Theodore Rosas MD

## 2019-06-06 LAB — INTERPRETATION ECG - MUSE: NORMAL

## 2019-06-13 NOTE — PROGRESS NOTES
ADVANCED HEART FAILURE CLINIC CONSULTATION     Name: Alonzo Jack  : 1940  MRN: [id number]    2019    Dear Mohit Jade and Estelita,    I had the pleasure of seeing Alonzo Jack, a 78 year old man today 2019 in the [facility] for evaluation of heart failure. He is here with his wife and granddaughter, Moriah.     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 In the last 6 months he has had increased dyspnea and atypical chest pain resulting in 5 emergency department visits and 2 recent hospital admissions. He has seen Dr. Quigley at [facility] for atypical chest pain and had a perfusion stress test that did not show any ischemia, but had a calcium score of 589. He was on clopidogrel for aspirin intolerance, but had significant epistaxis. Coronary angiography has not been pursued due to the DAPT intolerance and CKD. He is also continuing to require transfusions despite stopping plavix. He has had extensive GI evaluation including EGD and colonscopy which were negative, and capsule endoscopy, which I do not have access to the results.  In  his LVEF was 55-60% and on his most recent echo 19 his LVEF is 45% with LVEDD 5.88cm, basal-mid inferior hypokinesis, stage III diastolic dysfunction, mildly reduced RV function, RVSP 37 + RA, and moderate MR. He was hospitalized in April and the decision was made to transition to hospice care due to chest pain and heart failure. The family feels the hospice transition was abrupt as he was very functional 6 months ago. He was going fishing and going to the [employer] with his wife at that time. He is having chest pain that is  sharp that lasts 15 min to 1 hr and occurs only with rest and not activity. He thinks it is more frequent now that in was 6 months ago and the chest pain is also associated with fatigue and dyspnea. He did not feel any different while on plavix. He does feel the chest pain is worse when he is very anemic. His CAD risk factors include hypertension, dyslipidemia, diabetes, and tobacco use. He feel very anxious when he has chest pain. He is limiting activity due to chest pain and dyspnea. He took nitroglycerin tablet last weekend for chest pain but did not have any relief. He is in a wheel chair today, but he is howard to ambulate at home. He states that he is worried if he gets chest pain when outside of the home and would rather be in the wheel chair. He has dyspnea when going up the stairs with the oxygen tank. He is getting up to the bathroom and cooking without dyspnea or chest pain.  His weight at home is 179 lbs and he has no edema, orthopnea, or PND. He eats a low sodium diet, but drinks a minimum of a gallon of water. He denies lightheadedness or palpitations. His appetite is good.     REVIEW OF SYSTEMS:  10 point ROS neg other than the symptoms noted above in the HPI.    PAST MEDICAL HISTORY:   1. Chronic systolic and diastolic heart failure  A. Echo 4/22/19    LVEF 45%, small-mod inferolateral hypokinesis, LVEDD 5.88, normal wall thickness, mildly reduced RV function, RVSP 37   Moderate FLORENTIN  B. Echo 12/26/18: LVEF 50-55%    2. Non obstructive CAD with Total calcium score 598 4/2018  3. COPD on home oxygen, moderate obstruction, mild restriction  4. Hypertension  5. Dyslipidemia  6. Atrial fibrillation, not anticoagulated due to recurrent anemia  7. History of colon cancer diagnosed in 2017 s/p surgical resection, but no chemo or radiation were required   8. Recurrent epistaxis on plavix  9. Anemia, requiring blood transfusions     ALLERGIES:  Cyclobenzaprine     MEDICATIONS:     Current Outpatient Medications  on File Prior to Visit:  acetaminophen (TYLENOL) 325 MG tablet Take 1-2 tablets by mouth every 6 hours as needed.   albuterol (PROVENTIL HFA) 108 (90 Base) MCG/ACT inhaler Inhale 2 puffs into the lungs every 6 hours as needed   amLODIPine (NORVASC) 10 MG tablet Take 1 tablet by mouth daily   carvedilol (COREG) 25 MG tablet Take 1 tablet by mouth 2 times daily (with meals)   DULoxetine (CYMBALTA) 30 MG capsule Take 1 capsule by mouth daily   furosemide (LASIX) 40 MG tablet    glipiZIDE (GLUCOTROL) 10 MG tablet    guaiFENesin (ORGANIDIN) 200 MG tablet Take 200 mg by mouth as needed for cough   insulin glargine (LANTUS SOLOSTAR PEN) 100 UNIT/ML pen Inject 20 Units Subcutaneous At Bedtime   isosorbide mononitrate (IMDUR) 30 MG 24 hr tablet Take 1 tablet by mouth daily   melatonin 5 MG tablet Take 5 mg by mouth At Bedtime   nitroGLYcerin (NITROSTAT) 0.4 MG sublingual tablet Place 0.4 mg under the tongue Every 5 in up to 3 tabs   pantoprazole (PROTONIX) 40 MG EC tablet Take 1 tablet by mouth 2 times daily (before meals)   potassium chloride ER (K-DUR/KLOR-CON M) 20 MEQ CR tablet Take 1 tablet by mouth daily With a meal   spironolactone (ALDACTONE) 25 MG tablet Take 25 mg by mouth daily   tiotropium (SPIRIVA HANDIHALER) 18 MCG inhaled capsule Inhale 18 mcg into the lungs daily   tiZANidine (ZANAFLEX) 2 MG capsule Take 2 mg by mouth daily For muscle spasms   UNABLE TO FIND MEDICATION NAME: Morphine 7.5 PRN   atorvastatin (LIPITOR) 80 MG tablet Take 1 tablet by mouth daily HS   bumetanide (BUMEX) 2 MG tablet Take 1 tablet by mouth 2 times daily   losartan (COZAAR) 100 MG tablet Take 1 tablet by mouth daily   magnesium oxide (MAG-OX) 400 MG tablet Take 400 mg by mouth daily   metoprolol succinate ER (TOPROL-XL) 100 MG 24 hr tablet Take 1 tablet by mouth daily   oxyCODONE (ROXICODONE) 5 MG tablet Take 1 tablet by mouth every 6 hours as needed for pain   oxymetazoline (NASAL RELIEF) 0.05 % nasal spray Spray 2 sprays in nostril  "as needed for congestion Or epistaxis     No current facility-administered medications on file prior to visit.     SOCIAL HISTORY: He is  and lives with his wife in [place]. Moriah his granddaughter helps them frequently. No alcohol or illicits. Smoked 40 years ago - 2 ppd 15 years     FAMILY HISTORY: No pertinent family history in this 78 year-old man    PHYSICAL EXAM:   /81 (BP Location: Left arm, Patient Position: Chair, Cuff Size: Adult Regular)   Pulse 91   Ht 1.676 m (5' 6\")   Wt 81.3 kg (179 lb 3.2 oz)   SpO2 93%   BMI 28.92 kg/m    General: sitting in a wheelchair, NAD, conversant  Neck: Estimate JVP <8cm at 90 degrees, normal carotid upstroke  CV: RRR, nl s1 and s2, no murmurs, rubs, or gallops  Lungs: CTAB, no crackles or wheezes  Abdomen: BS+, soft, non tender, non distended   Extremities: warm and well perfused, trace edema   Neuro: normal speech and gait  Psych: anxious  Skin: ecchymosis at prior IV sites     DIAGNOSTIC TESTING:    Ref. Range 6/5/2019 14:50   Sodium Latest Ref Range: 133 - 144 mmol/L 133   Potassium Latest Ref Range: 3.4 - 5.3 mmol/L 4.2   Chloride Latest Ref Range: 94 - 109 mmol/L 98   Carbon Dioxide Latest Ref Range: 20 - 32 mmol/L 30   Urea Nitrogen Latest Ref Range: 7 - 30 mg/dL 28   Creatinine Latest Ref Range: 0.66 - 1.25 mg/dL 1.11   GFR Estimate Latest Ref Range: >60 mL/min/1.73_m2 64   GFR Estimate If Black Latest Ref Range: >60 mL/min/1.73_m2 74   Calcium Latest Ref Range: 8.5 - 10.1 mg/dL 8.3 (L)   Anion Gap Latest Ref Range: 3 - 14 mmol/L 6   Magnesium Latest Ref Range: 1.6 - 2.3 mg/dL 1.6   Albumin Latest Ref Range: 3.4 - 5.0 g/dL 3.0 (L)   Protein Total Latest Ref Range: 6.8 - 8.8 g/dL 6.9   Bilirubin Total Latest Ref Range: 0.2 - 1.3 mg/dL 0.8   Alkaline Phosphatase Latest Ref Range: 40 - 150 U/L 149   ALT Latest Ref Range: 0 - 70 U/L 43   AST Latest Ref Range: 0 - 45 U/L 23   N-Terminal Pro Bnp Latest Ref Range: 0 - 450 pg/mL 6,693 (H)   Glucose Latest " Ref Range: 70 - 99 mg/dL 127 (H)   WBC Latest Ref Range: 4.0 - 11.0 10e9/L 3.9 (L)   Hemoglobin Latest Ref Range: 13.3 - 17.7 g/dL 10.3 (L)   Hematocrit Latest Ref Range: 40.0 - 53.0 % 32.8 (L)   Platelet Count Latest Ref Range: 150 - 450 10e9/L 146 (L)   RBC Count Latest Ref Range: 4.4 - 5.9 10e12/L 3.93 (L)   MCV Latest Ref Range: 78 - 100 fl 84   MCH Latest Ref Range: 26.5 - 33.0 pg 26.2 (L)   MCHC Latest Ref Range: 31.5 - 36.5 g/dL 31.4 (L)   RDW Latest Ref Range: 10.0 - 15.0 % 16.4 (H)     ECG personally reviewed: sinus with PVCs, bifasicular block    Echocardiogram:12/26/2018  Results:  The ejection fraction is visually estimated at 50-55%.  Hypokinetic inferior wall.  Moderate mitral regurgitation.  Estimated pulmonary artery pressure of 55 mmHg + RA pressure.  Dilated IVC size, >50% collapse with respiration.  Estimated EF:    50-55%    Echocardiogram: 4/22/19  Normal left ventricular size; visually estimated ejection fraction is mild to moderately reduced at 45%.  Regional wall motion abnormalities noted: small to moderate sized area of basal to mid inferior/inferolateral hypokinesis to akinesis.  Mild right ventricular dilatation; mildly reduced right ventricular global systolic function.  Estimated right & left ventricular filling pressures are elevated.  Moderate biatrial dilatation.  Moderate functional (ischemic) mitral regurgitation.  Left pleural effusion.    When compared to the previous echocardiographic images of 12/26/2018, there has been no significant change.    ischemic work-up: 09/10/18 NM stress  IMPRESSION  1. Myocardial perfusion imaging is probably normal. Slight diaphragm attenuation artifact is noted but attenuation corrected imaging appears normal.  2. The pharmacological stress ECG is nondiagnostic due to baseline changes.  3. No prior studies to compare.  4. Abnormal calcium score is noted. Total score 598. Left main 27, , circumflex 20, RCA 0. This would put the patient at  the 66th percentile for age, match controls.      ASSESSMENT AND PLAN:     Thank you for allowing me to participate in the care of your patient. Please do not hesitate to contact me if you have any questions.     Sincerely,   Forum     Forum MD Al[place], [facility]  Advanced Heart Failure/Transplantation/MCS  [facility]/[employer]  [phone]      Answers for HPI/ROS submitted by the patient on 6/5/2019   General Symptoms: Yes  Skin Symptoms: No  HENT Symptoms: Yes  EYE SYMPTOMS: No  HEART SYMPTOMS: Yes  LUNG SYMPTOMS: Yes  INTESTINAL SYMPTOMS: Yes  URINARY SYMPTOMS: No  REPRODUCTIVE SYMPTOMS: No  SKELETAL SYMPTOMS: Yes  BLOOD SYMPTOMS: Yes  NERVOUS SYSTEM SYMPTOMS: No  MENTAL HEALTH SYMPTOMS: No  Fever: No  Loss of appetite: No  Weight loss: No  Weight gain: Yes  Fatigue: Yes  Night sweats: No  Chills: Yes  Increased stress: Yes  Excessive hunger: No  Excessive thirst: No  Feeling hot or cold when others believe the temperature is normal: Yes  Loss of height: No  Ear pain: No  Ear discharge: No  Hearing loss: No  Tinnitus: No  Nosebleeds: Yes  Congestion: Yes  Sinus pain: No  Trouble swallowing: No   Voice hoarseness: No  Mouth sores: No  Sore throat: No  Tooth pain: No  Gum tenderness: No  Bleeding gums: No  Change in taste: No  Change in sense of smell: No  Dry mouth: No  Hearing aid used: No  Neck lump: No  Cough: Yes  Sputum or phlegm: Yes  Coughing up blood: No  Difficulty breating or shortness of breath: Yes  Snoring: No  Wheezing: No  Difficulty breathing on exertion: Yes  Nighttime Cough: No  Difficulty breathing when lying flat: No  Chest pain or pressure: Yes  Fast or irregular heartbeat: No  Pain in legs with walking: No  Trouble breathing while lying down: No  Fingers or toes appear blue: No  High blood pressure: Yes  Low blood pressure: No  Fainting: No  Murmurs: No  Pacemaker: No  Varicose veins: No  Edema or swelling: Yes  Wake up at night with shortness of breath: No  Light-headedness:  No  Exercise intolerance: Yes  Heart burn or indigestion: No  Nausea: No  Vomiting: No  Abdominal pain: No  Bloating: No  Constipation: Yes  Diarrhea: Yes  Blood in stool: No  Black stools: No  Rectal or Anal pain: No  Fecal incontinence: No  Yellowing of skin or eyes: No  Vomit with blood: No  Change in stools: No  Back pain: Yes  Muscle aches: No  Neck pain: No  Swollen joints: No  Joint pain: No  Bone pain: No  Muscle cramps: No  Muscle weakness: No  Joint stiffness: Yes  Bone fracture: No  Anemia: Yes  Swollen glands: No  Easy bleeding or bruising: No    Answers for HPI/ROS submitted by the patient on 6/5/2019   General Symptoms: Yes  Skin Symptoms: No  HENT Symptoms: Yes  EYE SYMPTOMS: No  HEART SYMPTOMS: Yes  LUNG SYMPTOMS: Yes  INTESTINAL SYMPTOMS: Yes  URINARY SYMPTOMS: No  REPRODUCTIVE SYMPTOMS: No  SKELETAL SYMPTOMS: Yes  BLOOD SYMPTOMS: Yes  NERVOUS SYSTEM SYMPTOMS: No  MENTAL HEALTH SYMPTOMS: No  Fever: No  Loss of appetite: No  Weight loss: No  Weight gain: Yes  Fatigue: Yes  Night sweats: No  Chills: Yes  Increased stress: Yes  Excessive hunger: No  Excessive thirst: No  Feeling hot or cold when others believe the temperature is normal: Yes  Loss of height: No  Ear pain: No  Ear discharge: No  Hearing loss: No  Tinnitus: No  Nosebleeds: Yes  Congestion: Yes  Sinus pain: No  Trouble swallowing: No   Voice hoarseness: No  Mouth sores: No  Sore throat: No  Tooth pain: No  Gum tenderness: No  Bleeding gums: No  Change in taste: No  Change in sense of smell: No  Dry mouth: No  Hearing aid used: No  Neck lump: No  Cough: Yes  Sputum or phlegm: Yes  Coughing up blood: No  Difficulty breating or shortness of breath: Yes  Snoring: No  Wheezing: No  Difficulty breathing on exertion: Yes  Nighttime Cough: No  Difficulty breathing when lying flat: No  Chest pain or pressure: Yes  Fast or irregular heartbeat: No  Pain in legs with walking: No  Trouble breathing while lying down: No  Fingers or toes appear blue:  No  High blood pressure: Yes  Low blood pressure: No  Fainting: No  Murmurs: No  Pacemaker: No  Varicose veins: No  Edema or swelling: Yes  Wake up at night with shortness of breath: No  Light-headedness: No  Exercise intolerance: Yes  Heart burn or indigestion: No  Nausea: No  Vomiting: No  Abdominal pain: No  Bloating: No  Constipation: Yes  Diarrhea: Yes  Blood in stool: No  Black stools: No  Rectal or Anal pain: No  Fecal incontinence: No  Yellowing of skin or eyes: No  Vomit with blood: No  Change in stools: No  Back pain: Yes  Muscle aches: No  Neck pain: No  Swollen joints: No  Joint pain: No  Bone pain: No  Muscle cramps: No  Muscle weakness: No  Joint stiffness: Yes  Bone fracture: No  Anemia: Yes  Swollen glands: No  Easy bleeding or bruising: No

## 2019-06-13 NOTE — TELEPHONE ENCOUNTER
Health Call Center    Phone Message    May a detailed message be left on voicemail: yes    Reason for Call: Other: Kristi from [facility] hospice is calling to see what the next steps are for pt. She thought pt was supposed to be outreached to schedule for multiple appts. and wonders if there is a hold up due to pt. being on hospice. Please call to discuss as she would like to get her discharge for pt ready and make sure he is ready for his visits.      Action Taken: Message routed to:  [facility] ([facility] heart

## 2019-06-19 NOTE — PROGRESS NOTES
ADVANCED HEART FAILURE CLINIC CONSULTATION     Name: Alonzo Jack  : 1940  MRN: [id number]    2019    Dear Mohit Jade and Estelita,    I had the pleasure of seeing Alonzo Jack, a 78 year old man today 2019 in the [facility] for evaluation of heart failure. He is here with his wife and granddaughter, Moriah.     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 In the last 6 months he has had increased dyspnea and atypical chest pain resulting in 5 emergency department visits and 2 recent hospital admissions. He has seen Dr. Quigley at [facility] for atypical chest pain and had a perfusion stress test that did not show any ischemia, but had a calcium score of 589. He was on clopidogrel for aspirin intolerance, but had significant epistaxis. Coronary angiography has not been pursued due to the DAPT intolerance and CKD. He is also continuing to require transfusions despite stopping plavix. He has had extensive GI evaluation including EGD and colonscopy which were negative, and capsule endoscopy, which I do not have access to the results.  In  his LVEF was 55-60% and on his most recent echo 19 his LVEF is 45% with LVEDD 5.88cm, basal-mid inferior hypokinesis, stage III diastolic dysfunction, mildly reduced RV function, RVSP 37 + RA, and moderate MR. He was hospitalized in April and the decision was made to transition to hospice care due to chest pain and heart failure. The family feels the hospice transition was abrupt as he was very functional 6 months ago. He was going fishing and going to the [employer] with his wife at that time. He is having chest pain that is  sharp that lasts 15 min to 1 hr and occurs only with rest and not activity. He thinks it is more frequent now that in was 6 months ago and the chest pain is also associated with fatigue and dyspnea. He did not feel any different while on plavix. He does feel the chest pain is worse when he is very anemic. His CAD risk factors include hypertension, dyslipidemia, diabetes, and tobacco use. He feel very anxious when he has chest pain. He is limiting activity due to chest pain and dyspnea. He took nitroglycerin tablet last weekend for chest pain but did not have any relief. He is in a wheel chair today, but he is howard to ambulate at home. He states that he is worried if he gets chest pain when outside of the home and would rather be in the wheel chair. He has dyspnea when going up the stairs with the oxygen tank. He is getting up to the bathroom and cooking without dyspnea or chest pain.  His weight at home is 179 lbs and he has no edema, orthopnea, or PND. He eats a low sodium diet, but drinks a minimum of a gallon of water. He denies lightheadedness or palpitations. His appetite is good.     REVIEW OF SYSTEMS:  10 point ROS neg other than the symptoms noted above in the HPI.    PAST MEDICAL HISTORY:   1. Chronic systolic and diastolic heart failure  A. Echo 4/22/19    LVEF 45%, small-mod inferolateral hypokinesis, LVEDD 5.88, normal wall thickness, mildly reduced RV function, RVSP 37   Moderate FLORENTIN  B. Echo 12/26/18: LVEF 50-55%    2. Non obstructive CAD with Total calcium score 598 4/2018  3. COPD on home oxygen, moderate obstruction, mild restriction  4. Hypertension  5. Dyslipidemia  6. Atrial fibrillation, not anticoagulated due to recurrent anemia  7. History of colon cancer diagnosed in 2017 s/p surgical resection, but no chemo or radiation were required   8. Recurrent epistaxis on plavix  9. Anemia, requiring blood transfusions     ALLERGIES:  Cyclobenzaprine     MEDICATIONS:     Current Outpatient Medications  on File Prior to Visit:  acetaminophen (TYLENOL) 325 MG tablet Take 1-2 tablets by mouth every 6 hours as needed.   albuterol (PROVENTIL HFA) 108 (90 Base) MCG/ACT inhaler Inhale 2 puffs into the lungs every 6 hours as needed   amLODIPine (NORVASC) 10 MG tablet Take 1 tablet by mouth daily   carvedilol (COREG) 25 MG tablet Take 1 tablet by mouth 2 times daily (with meals)   DULoxetine (CYMBALTA) 30 MG capsule Take 1 capsule by mouth daily   furosemide (LASIX) 40 MG tablet    glipiZIDE (GLUCOTROL) 10 MG tablet    guaiFENesin (ORGANIDIN) 200 MG tablet Take 200 mg by mouth as needed for cough   insulin glargine (LANTUS SOLOSTAR PEN) 100 UNIT/ML pen Inject 20 Units Subcutaneous At Bedtime   isosorbide mononitrate (IMDUR) 30 MG 24 hr tablet Take 1 tablet by mouth daily   melatonin 5 MG tablet Take 5 mg by mouth At Bedtime   nitroGLYcerin (NITROSTAT) 0.4 MG sublingual tablet Place 0.4 mg under the tongue Every 5 in up to 3 tabs   pantoprazole (PROTONIX) 40 MG EC tablet Take 1 tablet by mouth 2 times daily (before meals)   potassium chloride ER (K-DUR/KLOR-CON M) 20 MEQ CR tablet Take 1 tablet by mouth daily With a meal   spironolactone (ALDACTONE) 25 MG tablet Take 25 mg by mouth daily   tiotropium (SPIRIVA HANDIHALER) 18 MCG inhaled capsule Inhale 18 mcg into the lungs daily   tiZANidine (ZANAFLEX) 2 MG capsule Take 2 mg by mouth daily For muscle spasms   UNABLE TO FIND MEDICATION NAME: Morphine 7.5 PRN   atorvastatin (LIPITOR) 80 MG tablet Take 1 tablet by mouth daily HS   bumetanide (BUMEX) 2 MG tablet Take 1 tablet by mouth 2 times daily   losartan (COZAAR) 100 MG tablet Take 1 tablet by mouth daily   magnesium oxide (MAG-OX) 400 MG tablet Take 400 mg by mouth daily   metoprolol succinate ER (TOPROL-XL) 100 MG 24 hr tablet Take 1 tablet by mouth daily   oxyCODONE (ROXICODONE) 5 MG tablet Take 1 tablet by mouth every 6 hours as needed for pain   oxymetazoline (NASAL RELIEF) 0.05 % nasal spray Spray 2 sprays in nostril  "as needed for congestion Or epistaxis     No current facility-administered medications on file prior to visit.     SOCIAL HISTORY: He is  and lives with his wife in [place]. Moriah his granddaughter helps them frequently. No alcohol or illicits. Smoked 40 years ago - 2 ppd 15 years     FAMILY HISTORY: No pertinent family history in this 78 year-old man    PHYSICAL EXAM:   /81 (BP Location: Left arm, Patient Position: Chair, Cuff Size: Adult Regular)   Pulse 91   Ht 1.676 m (5' 6\")   Wt 81.3 kg (179 lb 3.2 oz)   SpO2 93%   BMI 28.92 kg/m    General: sitting in a wheelchair, NAD, conversant  Neck: Estimate JVP <8cm at 90 degrees, normal carotid upstroke  CV: RRR, nl s1 and s2, no murmurs, rubs, or gallops  Lungs: CTAB, no crackles or wheezes  Abdomen: BS+, soft, non tender, non distended   Extremities: warm and well perfused, trace edema   Neuro: normal speech and gait  Psych: anxious  Skin: ecchymosis at prior IV sites     DIAGNOSTIC TESTING:    Ref. Range 6/5/2019 14:50   Sodium Latest Ref Range: 133 - 144 mmol/L 133   Potassium Latest Ref Range: 3.4 - 5.3 mmol/L 4.2   Chloride Latest Ref Range: 94 - 109 mmol/L 98   Carbon Dioxide Latest Ref Range: 20 - 32 mmol/L 30   Urea Nitrogen Latest Ref Range: 7 - 30 mg/dL 28   Creatinine Latest Ref Range: 0.66 - 1.25 mg/dL 1.11   GFR Estimate Latest Ref Range: >60 mL/min/1.73_m2 64   GFR Estimate If Black Latest Ref Range: >60 mL/min/1.73_m2 74   Calcium Latest Ref Range: 8.5 - 10.1 mg/dL 8.3 (L)   Anion Gap Latest Ref Range: 3 - 14 mmol/L 6   Magnesium Latest Ref Range: 1.6 - 2.3 mg/dL 1.6   Albumin Latest Ref Range: 3.4 - 5.0 g/dL 3.0 (L)   Protein Total Latest Ref Range: 6.8 - 8.8 g/dL 6.9   Bilirubin Total Latest Ref Range: 0.2 - 1.3 mg/dL 0.8   Alkaline Phosphatase Latest Ref Range: 40 - 150 U/L 149   ALT Latest Ref Range: 0 - 70 U/L 43   AST Latest Ref Range: 0 - 45 U/L 23   N-Terminal Pro Bnp Latest Ref Range: 0 - 450 pg/mL 6,693 (H)   Glucose Latest " Ref Range: 70 - 99 mg/dL 127 (H)   WBC Latest Ref Range: 4.0 - 11.0 10e9/L 3.9 (L)   Hemoglobin Latest Ref Range: 13.3 - 17.7 g/dL 10.3 (L)   Hematocrit Latest Ref Range: 40.0 - 53.0 % 32.8 (L)   Platelet Count Latest Ref Range: 150 - 450 10e9/L 146 (L)   RBC Count Latest Ref Range: 4.4 - 5.9 10e12/L 3.93 (L)   MCV Latest Ref Range: 78 - 100 fl 84   MCH Latest Ref Range: 26.5 - 33.0 pg 26.2 (L)   MCHC Latest Ref Range: 31.5 - 36.5 g/dL 31.4 (L)   RDW Latest Ref Range: 10.0 - 15.0 % 16.4 (H)     ECG personally reviewed: sinus with PVCs, bifasicular block    Echocardiogram:12/26/2018  Results:  The ejection fraction is visually estimated at 50-55%.  Hypokinetic inferior wall.  Moderate mitral regurgitation.  Estimated pulmonary artery pressure of 55 mmHg + RA pressure.  Dilated IVC size, >50% collapse with respiration.  Estimated EF:    50-55%    Echocardiogram: 4/22/19  Normal left ventricular size; visually estimated ejection fraction is mild to moderately reduced at 45%.  Regional wall motion abnormalities noted: small to moderate sized area of basal to mid inferior/inferolateral hypokinesis to akinesis.  Mild right ventricular dilatation; mildly reduced right ventricular global systolic function.  Estimated right & left ventricular filling pressures are elevated.  Moderate biatrial dilatation.  Moderate functional (ischemic) mitral regurgitation.  Left pleural effusion.    When compared to the previous echocardiographic images of 12/26/2018, there has been no significant change.    ischemic work-up: 09/10/18 NM stress  IMPRESSION  1. Myocardial perfusion imaging is probably normal. Slight diaphragm attenuation artifact is noted but attenuation corrected imaging appears normal.  2. The pharmacological stress ECG is nondiagnostic due to baseline changes.  3. No prior studies to compare.  4. Abnormal calcium score is noted. Total score 598. Left main 27, , circumflex 20, RCA 0. This would put the patient at  the 66th percentile for age, match controls.      ASSESSMENT AND PLAN: 78 year-old man with   1. Chronic systolic heart failure, secondary to undetermined etiology, Class II-III, stage C  2. Hypertension  3. Atypical chest pain  4. CAD based on imaging  5. Anemia, of undetermined etiology requiring transfusion   6. COPD on oxygen  7. CKD - creatine normalized     Mr. Jack has a number of complex medical problems as above and has had a functional decline in the past 6 months secondary to systolic heart failure, atypical chest pain, anemia and co-morbid conditions and is currently on hospice care.  From a heart failure standpoint he is well compensated, euvolemic by exam, and has normal end organ function. The etiology of his mild decline in LVEF has not been determined. It is unlikely related to infiltrative cardiomyopathy, though he has anemia and CKD. He has CAD by imaging and a number of CAD risk factors and this combined with hypertension are the most likely culprits. His symptoms including dyspnea and chest pain are somewhat out of proportion and exacerbated by anemia. He does feel considerably better now that his hemoglobin is more stable. His chest pain is atypical and has worsened in the last 6 months. I think it would be reasonable to obtain an ischemic evaluation given the clinical change to better stratify his symptoms. I would like to obtain a cardiac MRI with stress and contrast. If he does have obstructive CAD, I would need to discuss possible antiplatelet strategies with Dr. Jade. He is already on nitrates, but other medical management could also be considered such as ranolazine. He will also benefit from increased afterload reduction to reduce demand. Additionally, cardiac rehab may also be of benefit. He and his family will discuss what they would like to do as diagnostic testing would not be possible on hospice.     PLAN:   -CMR with stress and contrast  -discuss anemia with Dr. Jade        Thank you for allowing me to participate in the care of your patient. Please do not hesitate to contact me if you have any questions.     Sincerely,   Forum     Forum MD [place], [facility]  Advanced Heart Failure/Transplantation/MCS  [facility]/[place]  [phone]    ADDENDUM: I spoke with Dr. Kailyn Jade and she agreed that additional testing was reasonable and that if DAPT was needed that his anemia could be managed. He is responding well to iron infusions and will be monitored closely.     Answers for HPI/ROS submitted by the patient on 6/5/2019   General Symptoms: Yes  Skin Symptoms: No  HENT Symptoms: Yes  EYE SYMPTOMS: No  HEART SYMPTOMS: Yes  LUNG SYMPTOMS: Yes  INTESTINAL SYMPTOMS: Yes  URINARY SYMPTOMS: No  REPRODUCTIVE SYMPTOMS: No  SKELETAL SYMPTOMS: Yes  BLOOD SYMPTOMS: Yes  NERVOUS SYSTEM SYMPTOMS: No  MENTAL HEALTH SYMPTOMS: No  Fever: No  Loss of appetite: No  Weight loss: No  Weight gain: Yes  Fatigue: Yes  Night sweats: No  Chills: Yes  Increased stress: Yes  Excessive hunger: No  Excessive thirst: No  Feeling hot or cold when others believe the temperature is normal: Yes  Loss of height: No  Ear pain: No  Ear discharge: No  Hearing loss: No  Tinnitus: No  Nosebleeds: Yes  Congestion: Yes  Sinus pain: No  Trouble swallowing: No   Voice hoarseness: No  Mouth sores: No  Sore throat: No  Tooth pain: No  Gum tenderness: No  Bleeding gums: No  Change in taste: No  Change in sense of smell: No  Dry mouth: No  Hearing aid used: No  Neck lump: No  Cough: Yes  Sputum or phlegm: Yes  Coughing up blood: No  Difficulty breating or shortness of breath: Yes  Snoring: No  Wheezing: No  Difficulty breathing on exertion: Yes  Nighttime Cough: No  Difficulty breathing when lying flat: No  Chest pain or pressure: Yes  Fast or irregular heartbeat: No  Pain in legs with walking: No  Trouble breathing while lying down: No  Fingers or toes appear blue: No  High blood pressure: Yes  Low blood pressure:  No  Fainting: No  Murmurs: No  Pacemaker: No  Varicose veins: No  Edema or swelling: Yes  Wake up at night with shortness of breath: No  Light-headedness: No  Exercise intolerance: Yes  Heart burn or indigestion: No  Nausea: No  Vomiting: No  Abdominal pain: No  Bloating: No  Constipation: Yes  Diarrhea: Yes  Blood in stool: No  Black stools: No  Rectal or Anal pain: No  Fecal incontinence: No  Yellowing of skin or eyes: No  Vomit with blood: No  Change in stools: No  Back pain: Yes  Muscle aches: No  Neck pain: No  Swollen joints: No  Joint pain: No  Bone pain: No  Muscle cramps: No  Muscle weakness: No  Joint stiffness: Yes  Bone fracture: No  Anemia: Yes  Swollen glands: No  Easy bleeding or bruising: No    Answers for HPI/ROS submitted by the patient on 6/5/2019   General Symptoms: Yes  Skin Symptoms: No  HENT Symptoms: Yes  EYE SYMPTOMS: No  HEART SYMPTOMS: Yes  LUNG SYMPTOMS: Yes  INTESTINAL SYMPTOMS: Yes  URINARY SYMPTOMS: No  REPRODUCTIVE SYMPTOMS: No  SKELETAL SYMPTOMS: Yes  BLOOD SYMPTOMS: Yes  NERVOUS SYSTEM SYMPTOMS: No  MENTAL HEALTH SYMPTOMS: No  Fever: No  Loss of appetite: No  Weight loss: No  Weight gain: Yes  Fatigue: Yes  Night sweats: No  Chills: Yes  Increased stress: Yes  Excessive hunger: No  Excessive thirst: No  Feeling hot or cold when others believe the temperature is normal: Yes  Loss of height: No  Ear pain: No  Ear discharge: No  Hearing loss: No  Tinnitus: No  Nosebleeds: Yes  Congestion: Yes  Sinus pain: No  Trouble swallowing: No   Voice hoarseness: No  Mouth sores: No  Sore throat: No  Tooth pain: No  Gum tenderness: No  Bleeding gums: No  Change in taste: No  Change in sense of smell: No  Dry mouth: No  Hearing aid used: No  Neck lump: No  Cough: Yes  Sputum or phlegm: Yes  Coughing up blood: No  Difficulty breating or shortness of breath: Yes  Snoring: No  Wheezing: No  Difficulty breathing on exertion: Yes  Nighttime Cough: No  Difficulty breathing when lying flat: No  Chest pain  or pressure: Yes  Fast or irregular heartbeat: No  Pain in legs with walking: No  Trouble breathing while lying down: No  Fingers or toes appear blue: No  High blood pressure: Yes  Low blood pressure: No  Fainting: No  Murmurs: No  Pacemaker: No  Varicose veins: No  Edema or swelling: Yes  Wake up at night with shortness of breath: No  Light-headedness: No  Exercise intolerance: Yes  Heart burn or indigestion: No  Nausea: No  Vomiting: No  Abdominal pain: No  Bloating: No  Constipation: Yes  Diarrhea: Yes  Blood in stool: No  Black stools: No  Rectal or Anal pain: No  Fecal incontinence: No  Yellowing of skin or eyes: No  Vomit with blood: No  Change in stools: No  Back pain: Yes  Muscle aches: No  Neck pain: No  Swollen joints: No  Joint pain: No  Bone pain: No  Muscle cramps: No  Muscle weakness: No  Joint stiffness: Yes  Bone fracture: No  Anemia: Yes  Swollen glands: No  Easy bleeding or bruising: No

## 2020-06-04 NOTE — PROGRESS NOTES
Chief Complaint   Patient presents with     Ear Problem     hearing loss     History of Present Illness   Alonzo Jack is a 79 year old male who presents to me today for ear evaluation.  The patient reports decreased hearing for the last several years, worse over the last year.  It was not in onset.  The patient has noticed increased difficulty hearing certain sounds and difficulty in understanding others, especially in noisy or crowded situations.  There is no history of recent head trauma, or chronic ear disease or ear surgery.  The patient reports significant noise exposure history in  and work-related noise exposure.  No family history of hearing loss at a young age. The patient denies vertigo, otorrhea, otalgia.  He does report intermittent humming tinnitus that is nonpulsatile.    Past Medical History  Patient Active Problem List   Diagnosis     Acute on chronic diastolic (congestive) heart failure (H)     Atrial fibrillation with RVR (H)     CKD (chronic kidney disease) stage 3, GFR 30-59 ml/min (H)     Dyspnea on exertion     H/O: GI bleed     Hyperlipidemia     Malignant neoplasm of ascending colon (H)     NSTEMI (non-ST elevated myocardial infarction) (H)     Type 2 diabetes mellitus with complication, without long-term current use of insulin (H)     Chest pain     Pulmonary emphysema (H)     Community acquired pneumonia     Current Medications     Current Outpatient Medications:      acetaminophen (TYLENOL) 325 MG tablet, Take 1-2 tablets by mouth every 6 hours as needed., Disp: , Rfl:      albuterol (PROVENTIL HFA) 108 (90 Base) MCG/ACT inhaler, Inhale 2 puffs into the lungs every 6 hours as needed, Disp: , Rfl:      amLODIPine (NORVASC) 10 MG tablet, Take 1 tablet by mouth daily, Disp: , Rfl:      carvedilol (COREG) 25 MG tablet, Take 1 tablet by mouth 2 times daily (with meals), Disp: , Rfl:      DULoxetine (CYMBALTA) 30 MG capsule, Take 1 capsule by mouth daily, Disp: , Rfl:      furosemide  (LASIX) 40 MG tablet, , Disp: , Rfl: 0     glipiZIDE (GLUCOTROL) 10 MG tablet, , Disp: , Rfl: 11     guaiFENesin (ORGANIDIN) 200 MG tablet, Take 200 mg by mouth as needed for cough, Disp: , Rfl:      insulin glargine (LANTUS SOLOSTAR PEN) 100 UNIT/ML pen, Inject 20 Units Subcutaneous At Bedtime, Disp: , Rfl:      isosorbide mononitrate (IMDUR) 30 MG 24 hr tablet, Take 1 tablet by mouth daily, Disp: , Rfl:      melatonin 5 MG tablet, Take 5 mg by mouth At Bedtime, Disp: , Rfl:      nitroGLYcerin (NITROSTAT) 0.4 MG sublingual tablet, Place 0.4 mg under the tongue Every 5 in up to 3 tabs, Disp: , Rfl:      oxymetazoline (NASAL RELIEF) 0.05 % nasal spray, Spray 2 sprays in nostril as needed for congestion Or epistaxis, Disp: , Rfl:      pantoprazole (PROTONIX) 40 MG EC tablet, Take 1 tablet by mouth 2 times daily (before meals), Disp: , Rfl:      potassium chloride ER (K-DUR/KLOR-CON M) 20 MEQ CR tablet, Take 1 tablet by mouth daily With a meal, Disp: , Rfl:      spironolactone (ALDACTONE) 25 MG tablet, Take 25 mg by mouth daily, Disp: , Rfl:      tiotropium (SPIRIVA HANDIHALER) 18 MCG inhaled capsule, Inhale 18 mcg into the lungs daily, Disp: , Rfl:      tiZANidine (ZANAFLEX) 2 MG capsule, Take 2 mg by mouth daily For muscle spasms, Disp: , Rfl:      UNABLE TO FIND, MEDICATION NAME: Morphine 7.5 PRN, Disp: , Rfl:      atorvastatin (LIPITOR) 80 MG tablet, Take 1 tablet by mouth daily HS, Disp: , Rfl:      bumetanide (BUMEX) 2 MG tablet, Take 1 tablet by mouth 2 times daily, Disp: , Rfl:      losartan (COZAAR) 100 MG tablet, Take 1 tablet by mouth daily, Disp: , Rfl: 1     magnesium oxide (MAG-OX) 400 MG tablet, Take 400 mg by mouth daily, Disp: , Rfl:      metoprolol succinate ER (TOPROL-XL) 100 MG 24 hr tablet, Take 1 tablet by mouth daily, Disp: , Rfl: 10     oxyCODONE (ROXICODONE) 5 MG tablet, Take 1 tablet by mouth every 6 hours as needed for pain, Disp: , Rfl:     Allergies  Allergies   Allergen Reactions      Cyclobenzaprine Other (See Comments)     hallucinations       Social History   Social History     Socioeconomic History     Marital status:      Spouse name: Not on file     Number of children: Not on file     Years of education: Not on file     Highest education level: Not on file   Occupational History     Not on file   Social Needs     Financial resource strain: Not on file     Food insecurity     Worry: Not on file     Inability: Not on file     Transportation needs     Medical: Not on file     Non-medical: Not on file   Tobacco Use     Smoking status: Former Smoker     Smokeless tobacco: Never Used   Substance and Sexual Activity     Alcohol use: No     Drug use: No     Sexual activity: Not on file   Lifestyle     Physical activity     Days per week: Not on file     Minutes per session: Not on file     Stress: Not on file   Relationships     Social connections     Talks on phone: Not on file     Gets together: Not on file     Attends Holiness service: Not on file     Active member of club or organization: Not on file     Attends meetings of clubs or organizations: Not on file     Relationship status: Not on file     Intimate partner violence     Fear of current or ex partner: Not on file     Emotionally abused: Not on file     Physically abused: Not on file     Forced sexual activity: Not on file   Other Topics Concern     Parent/sibling w/ CABG, MI or angioplasty before 65F 55M? Not Asked   Social History Narrative     Not on file       Family History  History reviewed. No pertinent family history.    Review of Systems  As per HPI and PMHx, otherwise 10+ comprehensive system review is negative.    Physical Exam  /59 (BP Location: Right arm, Patient Position: Chair, Cuff Size: Adult Regular)   Pulse 63   Temp 97.1  F (36.2  C) (Tympanic)   Resp 18   GENERAL: Patient is a pleasant, cooperative 79 year old male in no acute distress.  HEAD: Normocephalic, atraumatic.  Hair and scalp are  normal.  EYES: Pupils are equal, round, reactive to light and accommodation.  Extraocular movements are intact.  The sclera nonicteric without injection.  The extraocular structures are normal.  EARS: Normal shape and symmetry.  No tenderness when palpating the mastoid or tragal areas bilaterally.  The ears were examined under the otic microscope.  Otoscopic exam on the right reveals a clear canal, intact tympanic membrane without evidence of effusion, good landmarks.  On the left, there is a moderate amount of cerumen.  The cerumen was removed with an alligator forceps.  The left tympanic membrane is round, intact without evidence of effusion, good landmarks.   NEUROLOGIC: Cranial nerves II through XII are grossly intact.  Voice is strong.  Facial nerve exam is incomplete due to the patient wearing a mask.  CARDIOVASCULAR: Extremities are warm and well-perfused.  No significant peripheral edema.  RESPIRATORY: Patient has nonlabored breathing without cough, wheeze, stridor.  PSYCHIATRIC: Patient is alert and oriented.  Mood and affect appear normal.  SKIN: Warm and dry.  No scalp, face, or neck lesions noted.    Audiogram  The patient underwent an audiogram performed today.  My review of the audiogram shows mild sloping to moderately severe sensorineural hearing loss in both ears.  Pure-tone average is 51 dB on the right and 50 dB on the left.  Speech reception threshhold is 45 dB on the right and 40 dB on the left.  The patient had 68% word recognition on the right and 72% word recognition on the left.  The patient had a type A shallow tympanogram on the right and a type A shallow tympanogram on the left.     Assessment and Plan     ICD-10-CM    1. Sensorineural hearing loss, bilateral  H90.3 AUDIOLOGY ADULT REFERRAL     Microscopy, Binocular     It was my pleasure seeing Alonzo Jack today in clinic.  The patient presents today with hearing loss.  This is most consistent with presbycusis. I can find no evidence  of serious CNS disorders or other complicating factors that could be causing this.  We spent the remainder of today's visit on education. We discussed hearing protection in noisy environments.    The patient is medically cleared for consideration of a hearing aid evaluation.  Also discussed the potential for going through [facility] for the [facility] hearing benefit.    The patient will follow up as necessary for worsening symptoms or changes in symptoms. I have also recommended repeat audiogram in 1-3 years, or sooner if symptoms warrant.      Travon Dietrich MD  Department of Otolarygology-Head and Neck Surgery  [facility]

## 2020-06-08 ENCOUNTER — OFFICE VISIT (OUTPATIENT)
Dept: AUDIOLOGY | Facility: CLINIC | Age: 80
End: 2020-06-08
Payer: COMMERCIAL

## 2020-06-08 ENCOUNTER — OFFICE VISIT (OUTPATIENT)
Dept: OTOLARYNGOLOGY | Facility: CLINIC | Age: 80
End: 2020-06-08
Payer: COMMERCIAL

## 2020-06-08 VITALS
SYSTOLIC BLOOD PRESSURE: 121 MMHG | DIASTOLIC BLOOD PRESSURE: 59 MMHG | TEMPERATURE: 97.1 F | HEART RATE: 63 BPM | RESPIRATION RATE: 18 BRPM

## 2020-06-08 DIAGNOSIS — H90.3 SENSORINEURAL HEARING LOSS, BILATERAL: Primary | ICD-10-CM

## 2020-06-08 PROCEDURE — 99203 OFFICE O/P NEW LOW 30 MIN: CPT | Mod: 25 | Performed by: OTOLARYNGOLOGY

## 2020-06-08 PROCEDURE — 92504 EAR MICROSCOPY EXAMINATION: CPT | Performed by: OTOLARYNGOLOGY

## 2020-06-08 PROCEDURE — 92557 COMPREHENSIVE HEARING TEST: CPT | Performed by: AUDIOLOGIST

## 2020-06-08 PROCEDURE — 99207 ZZC NO CHARGE LOS: CPT | Performed by: AUDIOLOGIST

## 2020-06-08 PROCEDURE — 92550 TYMPANOMETRY & REFLEX THRESH: CPT | Performed by: AUDIOLOGIST

## 2020-06-08 NOTE — LETTER
6/8/2020         RE: Alonzo Jack  [address] [id number]        Dear Colleague,    Thank you for referring your patient, Alonzo Jack, to the [facility]. Please see a copy of my visit note below.    Chief Complaint   Patient presents with     Ear Problem     hearing loss     History of Present Illness   Alonzo Jack is a 79 year old male who presents to me today for ear evaluation.  The patient reports decreased hearing for the last several years, worse over the last year.  It was not in onset.  The patient has noticed increased difficulty hearing certain sounds and difficulty in understanding others, especially in noisy or crowded situations.  There is no history of recent head trauma, or chronic ear disease or ear surgery.  The patient reports significant noise exposure history in  and work-related noise exposure.  No family history of hearing loss at a young age. The patient denies vertigo, otorrhea, otalgia.  He does report intermittent humming tinnitus that is nonpulsatile.    Past Medical History  Patient Active Problem List   Diagnosis     Acute on chronic diastolic (congestive) heart failure (H)     Atrial fibrillation with RVR (H)     CKD (chronic kidney disease) stage 3, GFR 30-59 ml/min (H)     Dyspnea on exertion     H/O: GI bleed     Hyperlipidemia     Malignant neoplasm of ascending colon (H)     NSTEMI (non-ST elevated myocardial infarction) (H)     Type 2 diabetes mellitus with complication, without long-term current use of insulin (H)     Chest pain     Pulmonary emphysema (H)     Community acquired pneumonia     Current Medications     Current Outpatient Medications:      acetaminophen (TYLENOL) 325 MG tablet, Take 1-2 tablets by mouth every 6 hours as needed., Disp: , Rfl:      albuterol (PROVENTIL HFA) 108 (90 Base) MCG/ACT inhaler, Inhale 2 puffs into the lungs every 6 hours as needed, Disp: , Rfl:      amLODIPine (NORVASC) 10 MG tablet, Take 1  tablet by mouth daily, Disp: , Rfl:      carvedilol (COREG) 25 MG tablet, Take 1 tablet by mouth 2 times daily (with meals), Disp: , Rfl:      DULoxetine (CYMBALTA) 30 MG capsule, Take 1 capsule by mouth daily, Disp: , Rfl:      furosemide (LASIX) 40 MG tablet, , Disp: , Rfl: 0     glipiZIDE (GLUCOTROL) 10 MG tablet, , Disp: , Rfl: 11     guaiFENesin (ORGANIDIN) 200 MG tablet, Take 200 mg by mouth as needed for cough, Disp: , Rfl:      insulin glargine (LANTUS SOLOSTAR PEN) 100 UNIT/ML pen, Inject 20 Units Subcutaneous At Bedtime, Disp: , Rfl:      isosorbide mononitrate (IMDUR) 30 MG 24 hr tablet, Take 1 tablet by mouth daily, Disp: , Rfl:      melatonin 5 MG tablet, Take 5 mg by mouth At Bedtime, Disp: , Rfl:      nitroGLYcerin (NITROSTAT) 0.4 MG sublingual tablet, Place 0.4 mg under the tongue Every 5 in up to 3 tabs, Disp: , Rfl:      oxymetazoline (NASAL RELIEF) 0.05 % nasal spray, Spray 2 sprays in nostril as needed for congestion Or epistaxis, Disp: , Rfl:      pantoprazole (PROTONIX) 40 MG EC tablet, Take 1 tablet by mouth 2 times daily (before meals), Disp: , Rfl:      potassium chloride ER (K-DUR/KLOR-CON M) 20 MEQ CR tablet, Take 1 tablet by mouth daily With a meal, Disp: , Rfl:      spironolactone (ALDACTONE) 25 MG tablet, Take 25 mg by mouth daily, Disp: , Rfl:      tiotropium (SPIRIVA HANDIHALER) 18 MCG inhaled capsule, Inhale 18 mcg into the lungs daily, Disp: , Rfl:      tiZANidine (ZANAFLEX) 2 MG capsule, Take 2 mg by mouth daily For muscle spasms, Disp: , Rfl:      UNABLE TO FIND, MEDICATION NAME: Morphine 7.5 PRN, Disp: , Rfl:      atorvastatin (LIPITOR) 80 MG tablet, Take 1 tablet by mouth daily HS, Disp: , Rfl:      bumetanide (BUMEX) 2 MG tablet, Take 1 tablet by mouth 2 times daily, Disp: , Rfl:      losartan (COZAAR) 100 MG tablet, Take 1 tablet by mouth daily, Disp: , Rfl: 1     magnesium oxide (MAG-OX) 400 MG tablet, Take 400 mg by mouth daily, Disp: , Rfl:      metoprolol succinate ER  (TOPROL-XL) 100 MG 24 hr tablet, Take 1 tablet by mouth daily, Disp: , Rfl: 10     oxyCODONE (ROXICODONE) 5 MG tablet, Take 1 tablet by mouth every 6 hours as needed for pain, Disp: , Rfl:     Allergies  Allergies   Allergen Reactions     Cyclobenzaprine Other (See Comments)     hallucinations       Social History   Social History     Socioeconomic History     Marital status:      Spouse name: Not on file     Number of children: Not on file     Years of education: Not on file     Highest education level: Not on file   Occupational History     Not on file   Social Needs     Financial resource strain: Not on file     Food insecurity     Worry: Not on file     Inability: Not on file     Transportation needs     Medical: Not on file     Non-medical: Not on file   Tobacco Use     Smoking status: Former Smoker     Smokeless tobacco: Never Used   Substance and Sexual Activity     Alcohol use: No     Drug use: No     Sexual activity: Not on file   Lifestyle     Physical activity     Days per week: Not on file     Minutes per session: Not on file     Stress: Not on file   Relationships     Social connections     Talks on phone: Not on file     Gets together: Not on file     Attends [employer] service: Not on file     Active member of club or organization: Not on file     Attends meetings of clubs or organizations: Not on file     Relationship status: Not on file     Intimate partner violence     Fear of current or ex partner: Not on file     Emotionally abused: Not on file     Physically abused: Not on file     Forced sexual activity: Not on file   Other Topics Concern     Parent/sibling w/ CABG, MI or angioplasty before 65F 55M? Not Asked   Social History Narrative     Not on file       Family History  History reviewed. No pertinent family history.    Review of Systems  As per HPI and PMHx, otherwise 10+ comprehensive system review is negative.    Physical Exam  /59 (BP Location: Right arm, Patient Position:  Chair, Cuff Size: Adult Regular)   Pulse 63   Temp 97.1  F (36.2  C) (Tympanic)   Resp 18   GENERAL: Patient is a pleasant, cooperative 79 year old male in no acute distress.  HEAD: Normocephalic, atraumatic.  Hair and scalp are normal.  EYES: Pupils are equal, round, reactive to light and accommodation.  Extraocular movements are intact.  The sclera nonicteric without injection.  The extraocular structures are normal.  EARS: Normal shape and symmetry.  No tenderness when palpating the mastoid or tragal areas bilaterally.  The ears were examined under the otic microscope.  Otoscopic exam on the right reveals a clear canal, intact tympanic membrane without evidence of effusion, good landmarks.  On the left, there is a moderate amount of cerumen.  The cerumen was removed with an alligator forceps.  The left tympanic membrane is round, intact without evidence of effusion, good landmarks.   NEUROLOGIC: Cranial nerves II through XII are grossly intact.  Voice is strong.  Facial nerve exam is incomplete due to the patient wearing a mask.  CARDIOVASCULAR: Extremities are warm and well-perfused.  No significant peripheral edema.  RESPIRATORY: Patient has nonlabored breathing without cough, wheeze, stridor.  PSYCHIATRIC: Patient is alert and oriented.  Mood and affect appear normal.  SKIN: Warm and dry.  No scalp, face, or neck lesions noted.    Audiogram  The patient underwent an audiogram performed today.  My review of the audiogram shows mild sloping to moderately severe sensorineural hearing loss in both ears.  Pure-tone average is 51 dB on the right and 50 dB on the left.  Speech reception threshhold is 45 dB on the right and 40 dB on the left.  The patient had 68% word recognition on the right and 72% word recognition on the left.  The patient had a type A shallow tympanogram on the right and a type A shallow tympanogram on the left.     Assessment and Plan     ICD-10-CM    1. Sensorineural hearing loss, bilateral   H90.3 AUDIOLOGY ADULT REFERRAL     Microscopy, Binocular     It was my pleasure seeing Alonzo Jack today in clinic.  The patient presents today with hearing loss.  This is most consistent with presbycusis. I can find no evidence of serious CNS disorders or other complicating factors that could be causing this.  We spent the remainder of today's visit on education. We discussed hearing protection in noisy environments.    The patient is medically cleared for consideration of a hearing aid evaluation.  Also discussed the potential for going through [facility] for the [facility] hearing benefit.    The patient will follow up as necessary for worsening symptoms or changes in symptoms. I have also recommended repeat audiogram in 1-3 years, or sooner if symptoms warrant.      Travon Dietrich MD  Department of Otolarygology-Head and Neck Surgery  [facility]       Again, thank you for allowing me to participate in the care of your patient.        Sincerely,        Travon Dietrich MD

## 2020-06-08 NOTE — NURSING NOTE
"Chief Complaint   Patient presents with     Ear Problem     hearing loss       Initial /59 (BP Location: Right arm, Patient Position: Chair, Cuff Size: Adult Regular)   Pulse 63   Temp 97.1  F (36.2  C) (Tympanic)   Resp 18  Estimated body mass index is 28.92 kg/m  as calculated from the following:    Height as of 6/5/19: 1.676 m (5' 6\").    Weight as of 6/5/19: 81.3 kg (179 lb 3.2 oz).  Medications and allergies reviewed.    Lam JAQUEZ CMA (AAMA)    "

## 2020-06-08 NOTE — PROGRESS NOTES
AUDIOLOGY REPORT    SUBJECTIVE:  Alonzo Jack is a 79 year old male who was seen at [facility] for an audiologic evaluation, referred by Dr. Dietrich.  No previous audiograms are available at today's appointment.  The patient reports a gradual decrease in hearing bilaterally. He reports difficulty hearing his family and with the TV. He reports a long standing history of occupational noise exposure. The patient denies bilateral otalgia.     OBJECTIVE:    Otoscopic exam indicates cerumen in the left ear       Pure Tone Thresholds assessed using conventional audiometry with good  reliability from 250-8000 Hz bilaterally using circumaural headphones     RIGHT:  mild, moderate and severe sensorineural hearing loss    LEFT:    mild, moderate and severe sensorineural hearing loss    Tympanogram:    RIGHT: normal eardrum mobility    LEFT:   normal eardrum mobility    Reflexes (reported by stimulus ear 1000 Hz):     RIGHT: Ipsilateral is absent    RIGHT: Contralateral is absent    LEFT: Ipsilateral is absent    LEFT: Contralateral is absent    Speech Reception Threshold:    RIGHT: 45 dB HL    LEFT:   40 dB HL  Word Recognition Score:     RIGHT: 68% at 75 dB HL using NU-6 recorded word list.    LEFT:   72% at 75 dB HL using NU-6 recorded word list.      ASSESSMENT:   Mild sloping to severe sensorineural hearing loss bilaterally.     Today s results were discussed with the patient in detail.     PLAN: It is recommended that the patient be seen by Dr. Dietrich for medical evaluation of their ears and hearing evaluation. Patient was counseled regarding hearing loss and impact on communication. Patient is a good candidate for amplification at this time.A [facility] information packet was given to the patient.Please call this clinic with questions regarding these results or recommendations.        Lori Terry M.A. STEPHANIE-AAA  Clinical audiologist [place] # [id number]  6/8/2020

## 2020-06-08 NOTE — PATIENT INSTRUCTIONS
Per physician instructions.    If you have questions or concerns on any instructions given to you by your provider today or if you need to schedule an appointment, you can reach us at [phone].    Thank you!

## 2020-07-08 ENCOUNTER — TELEPHONE (OUTPATIENT)
Dept: OTOLARYNGOLOGY | Facility: CLINIC | Age: 80
End: 2020-07-08

## 2020-07-08 NOTE — TELEPHONE ENCOUNTER
Reason for Call:  Other call back    Detailed comments: pt wife calling stating pt was seen and would like to know more info on HA. Cost and if ins will cover some cost.     Phone Number Patient can be reached at: Home number on file [phone] (home)    Best Time: any     Can we leave a detailed message on this number? YES    Call taken on 7/8/2020 at 1:14 PM by Mary Carpenter

## 2020-07-09 NOTE — TELEPHONE ENCOUNTER
Left message on general costs of hearing aids. Recommend they contact their insurance to check on hearing aid benefits.    Lori Terry M.A. -AAA, #[id number]

## 2023-01-01 ENCOUNTER — LAB REQUISITION (OUTPATIENT)
Dept: LAB | Facility: CLINIC | Age: 83
End: 2023-01-01

## 2023-01-01 DIAGNOSIS — I10 ESSENTIAL (PRIMARY) HYPERTENSION: ICD-10-CM

## 2023-01-01 LAB
ANION GAP SERPL CALCULATED.3IONS-SCNC: 16 MMOL/L (ref 7–15)
BUN SERPL-MCNC: 49.6 MG/DL (ref 8–23)
CALCIUM SERPL-MCNC: 8.4 MG/DL (ref 8.8–10.2)
CHLORIDE SERPL-SCNC: 101 MMOL/L (ref 98–107)
CREAT SERPL-MCNC: 1.88 MG/DL (ref 0.67–1.17)
DEPRECATED CALCIDIOL+CALCIFEROL SERPL-MC: 45 UG/L (ref 20–75)
DEPRECATED HCO3 PLAS-SCNC: 21 MMOL/L (ref 22–29)
ERYTHROCYTE [DISTWIDTH] IN BLOOD BY AUTOMATED COUNT: 14.9 % (ref 10–15)
GFR SERPL CREATININE-BSD FRML MDRD: 36 ML/MIN/1.73M2
GLUCOSE SERPL-MCNC: 171 MG/DL (ref 70–99)
HCT VFR BLD AUTO: 29 % (ref 40–53)
HGB BLD-MCNC: 9.2 G/DL (ref 13.3–17.7)
MCH RBC QN AUTO: 28.2 PG (ref 26.5–33)
MCHC RBC AUTO-ENTMCNC: 31.7 G/DL (ref 31.5–36.5)
MCV RBC AUTO: 89 FL (ref 78–100)
PLATELET # BLD AUTO: 177 10E3/UL (ref 150–450)
POTASSIUM SERPL-SCNC: 4.2 MMOL/L (ref 3.4–5.3)
RBC # BLD AUTO: 3.26 10E6/UL (ref 4.4–5.9)
SODIUM SERPL-SCNC: 138 MMOL/L (ref 136–145)
WBC # BLD AUTO: 4.2 10E3/UL (ref 4–11)

## 2023-01-01 PROCEDURE — 85027 COMPLETE CBC AUTOMATED: CPT | Performed by: FAMILY MEDICINE

## 2023-01-01 PROCEDURE — 80048 BASIC METABOLIC PNL TOTAL CA: CPT | Performed by: FAMILY MEDICINE

## 2023-01-01 PROCEDURE — 82306 VITAMIN D 25 HYDROXY: CPT | Performed by: FAMILY MEDICINE
